# Patient Record
Sex: MALE | Race: WHITE | Employment: UNEMPLOYED | URBAN - METROPOLITAN AREA
[De-identification: names, ages, dates, MRNs, and addresses within clinical notes are randomized per-mention and may not be internally consistent; named-entity substitution may affect disease eponyms.]

---

## 2018-05-09 ENCOUNTER — OFFICE VISIT (OUTPATIENT)
Dept: ORTHOPEDIC SURGERY | Age: 58
End: 2018-05-09

## 2018-05-09 VITALS
BODY MASS INDEX: 30.71 KG/M2 | HEART RATE: 71 BPM | SYSTOLIC BLOOD PRESSURE: 112 MMHG | HEIGHT: 64 IN | DIASTOLIC BLOOD PRESSURE: 69 MMHG | WEIGHT: 179.9 LBS

## 2018-05-09 DIAGNOSIS — M48.062 SPINAL STENOSIS OF LUMBAR REGION WITH NEUROGENIC CLAUDICATION: Primary | ICD-10-CM

## 2018-05-09 PROCEDURE — 99204 OFFICE O/P NEW MOD 45 MIN: CPT | Performed by: PHYSICAL MEDICINE & REHABILITATION

## 2018-05-09 RX ORDER — PREDNISONE 10 MG/1
TABLET ORAL
Qty: 26 TABLET | Refills: 0 | Status: SHIPPED | OUTPATIENT
Start: 2018-05-09 | End: 2018-09-05

## 2018-05-09 RX ORDER — MELOXICAM 15 MG/1
15 TABLET ORAL DAILY
Qty: 30 TABLET | Refills: 1 | Status: SHIPPED | OUTPATIENT
Start: 2018-05-09 | End: 2018-09-05

## 2018-08-28 ENCOUNTER — HOSPITAL ENCOUNTER (EMERGENCY)
Age: 58
Discharge: HOME | End: 2018-08-29
Attending: EMERGENCY MEDICINE
Payer: COMMERCIAL

## 2018-08-28 ENCOUNTER — APPOINTMENT (OUTPATIENT)
Dept: CT IMAGING | Age: 58
End: 2018-08-28
Payer: COMMERCIAL

## 2018-08-28 DIAGNOSIS — N13.30 HYDROURETERONEPHROSIS: ICD-10-CM

## 2018-08-28 DIAGNOSIS — N20.1 URETEROLITHIASIS: ICD-10-CM

## 2018-08-28 DIAGNOSIS — R10.9 FLANK PAIN: Primary | ICD-10-CM

## 2018-08-28 DIAGNOSIS — K57.90 DIVERTICULOSIS OF INTESTINE WITHOUT BLEEDING, UNSPECIFIED INTESTINAL TRACT LOCATION: ICD-10-CM

## 2018-08-28 DIAGNOSIS — K80.20 CALCULUS OF GALLBLADDER WITHOUT CHOLECYSTITIS WITHOUT OBSTRUCTION: ICD-10-CM

## 2018-08-28 DIAGNOSIS — N20.0 NEPHROLITHIASIS: ICD-10-CM

## 2018-08-28 LAB
A/G RATIO: 1.4 (ref 1.1–2.2)
ALBUMIN SERPL-MCNC: 4.3 G/DL (ref 3.4–5)
ALP BLD-CCNC: 67 U/L (ref 40–129)
ALT SERPL-CCNC: 10 U/L (ref 10–40)
ANION GAP SERPL CALCULATED.3IONS-SCNC: 15 MMOL/L (ref 3–16)
AST SERPL-CCNC: 17 U/L (ref 15–37)
BASOPHILS ABSOLUTE: 0.1 K/UL (ref 0–0.2)
BASOPHILS RELATIVE PERCENT: 0.8 %
BILIRUB SERPL-MCNC: 0.4 MG/DL (ref 0–1)
BUN BLDV-MCNC: 21 MG/DL (ref 7–20)
CALCIUM SERPL-MCNC: 9.4 MG/DL (ref 8.3–10.6)
CHLORIDE BLD-SCNC: 104 MMOL/L (ref 99–110)
CO2: 20 MMOL/L (ref 21–32)
CREAT SERPL-MCNC: 0.9 MG/DL (ref 0.9–1.3)
EOSINOPHILS ABSOLUTE: 0 K/UL (ref 0–0.6)
EOSINOPHILS RELATIVE PERCENT: 0.3 %
GFR AFRICAN AMERICAN: >60
GFR NON-AFRICAN AMERICAN: >60
GLOBULIN: 3 G/DL
GLUCOSE BLD-MCNC: 108 MG/DL (ref 70–99)
HCT VFR BLD CALC: 39.5 % (ref 40.5–52.5)
HEMOGLOBIN: 13.2 G/DL (ref 13.5–17.5)
LYMPHOCYTES ABSOLUTE: 1 K/UL (ref 1–5.1)
LYMPHOCYTES RELATIVE PERCENT: 7.3 %
MCH RBC QN AUTO: 31.4 PG (ref 26–34)
MCHC RBC AUTO-ENTMCNC: 33.4 G/DL (ref 31–36)
MCV RBC AUTO: 94.1 FL (ref 80–100)
MONOCYTES ABSOLUTE: 0.8 K/UL (ref 0–1.3)
MONOCYTES RELATIVE PERCENT: 6.2 %
NEUTROPHILS ABSOLUTE: 11.3 K/UL (ref 1.7–7.7)
NEUTROPHILS RELATIVE PERCENT: 85.4 %
PDW BLD-RTO: 13.7 % (ref 12.4–15.4)
PLATELET # BLD: 213 K/UL (ref 135–450)
PMV BLD AUTO: 8.2 FL (ref 5–10.5)
POTASSIUM SERPL-SCNC: 4.1 MMOL/L (ref 3.5–5.1)
RBC # BLD: 4.2 M/UL (ref 4.2–5.9)
SODIUM BLD-SCNC: 139 MMOL/L (ref 136–145)
TOTAL PROTEIN: 7.3 G/DL (ref 6.4–8.2)
WBC # BLD: 13.2 K/UL (ref 4–11)

## 2018-08-28 PROCEDURE — 74176 CT ABD & PELVIS W/O CONTRAST: CPT

## 2018-08-28 PROCEDURE — 6360000002 HC RX W HCPCS: Performed by: EMERGENCY MEDICINE

## 2018-08-28 PROCEDURE — 96361 HYDRATE IV INFUSION ADD-ON: CPT

## 2018-08-28 PROCEDURE — 2580000003 HC RX 258: Performed by: EMERGENCY MEDICINE

## 2018-08-28 PROCEDURE — 96374 THER/PROPH/DIAG INJ IV PUSH: CPT

## 2018-08-28 PROCEDURE — 80053 COMPREHEN METABOLIC PANEL: CPT

## 2018-08-28 PROCEDURE — 96375 TX/PRO/DX INJ NEW DRUG ADDON: CPT

## 2018-08-28 PROCEDURE — 99284 EMERGENCY DEPT VISIT MOD MDM: CPT

## 2018-08-28 PROCEDURE — 85025 COMPLETE CBC W/AUTO DIFF WBC: CPT

## 2018-08-28 RX ORDER — TAMSULOSIN HYDROCHLORIDE 0.4 MG/1
0.4 CAPSULE ORAL DAILY
Status: DISCONTINUED | OUTPATIENT
Start: 2018-08-29 | End: 2018-08-29

## 2018-08-28 RX ORDER — 0.9 % SODIUM CHLORIDE 0.9 %
1000 INTRAVENOUS SOLUTION INTRAVENOUS ONCE
Status: COMPLETED | OUTPATIENT
Start: 2018-08-28 | End: 2018-08-28

## 2018-08-28 RX ORDER — KETOROLAC TROMETHAMINE 30 MG/ML
30 INJECTION, SOLUTION INTRAMUSCULAR; INTRAVENOUS ONCE
Status: COMPLETED | OUTPATIENT
Start: 2018-08-28 | End: 2018-08-28

## 2018-08-28 RX ORDER — ONDANSETRON 2 MG/ML
4 INJECTION INTRAMUSCULAR; INTRAVENOUS EVERY 30 MIN PRN
Status: DISCONTINUED | OUTPATIENT
Start: 2018-08-28 | End: 2018-08-29 | Stop reason: HOSPADM

## 2018-08-28 RX ADMIN — KETOROLAC TROMETHAMINE 30 MG: 30 INJECTION, SOLUTION INTRAMUSCULAR at 22:32

## 2018-08-28 RX ADMIN — ONDANSETRON HYDROCHLORIDE 4 MG: 2 INJECTION, SOLUTION INTRAMUSCULAR; INTRAVENOUS at 22:32

## 2018-08-28 RX ADMIN — Medication 1 MG: at 22:32

## 2018-08-28 RX ADMIN — SODIUM CHLORIDE 1000 ML: 9 INJECTION, SOLUTION INTRAVENOUS at 22:32

## 2018-08-28 ASSESSMENT — PAIN DESCRIPTION - PAIN TYPE
TYPE: ACUTE PAIN
TYPE: ACUTE PAIN

## 2018-08-28 ASSESSMENT — PAIN SCALES - GENERAL
PAINLEVEL_OUTOF10: 7
PAINLEVEL_OUTOF10: 5
PAINLEVEL_OUTOF10: 10
PAINLEVEL_OUTOF10: 8

## 2018-08-28 ASSESSMENT — PAIN DESCRIPTION - ORIENTATION
ORIENTATION: RIGHT

## 2018-08-28 ASSESSMENT — PAIN DESCRIPTION - LOCATION
LOCATION: FLANK
LOCATION: ABDOMEN

## 2018-08-28 ASSESSMENT — PAIN DESCRIPTION - DESCRIPTORS: DESCRIPTORS: ACHING

## 2018-08-29 VITALS
HEART RATE: 65 BPM | BODY MASS INDEX: 25.11 KG/M2 | OXYGEN SATURATION: 96 % | HEIGHT: 67 IN | SYSTOLIC BLOOD PRESSURE: 125 MMHG | TEMPERATURE: 98 F | RESPIRATION RATE: 14 BRPM | DIASTOLIC BLOOD PRESSURE: 90 MMHG | WEIGHT: 160 LBS

## 2018-08-29 LAB
BILIRUBIN URINE: NEGATIVE
BLOOD, URINE: ABNORMAL
CLARITY: CLEAR
COLOR: YELLOW
EPITHELIAL CELLS, UA: ABNORMAL /HPF
GLUCOSE URINE: NEGATIVE MG/DL
KETONES, URINE: 40 MG/DL
LEUKOCYTE ESTERASE, URINE: NEGATIVE
MICROSCOPIC EXAMINATION: YES
MUCUS: ABNORMAL /LPF
NITRITE, URINE: NEGATIVE
PH UA: 6
PROTEIN UA: ABNORMAL MG/DL
RBC UA: ABNORMAL /HPF (ref 0–2)
SPECIFIC GRAVITY UA: 1.02
URINE TYPE: ABNORMAL
UROBILINOGEN, URINE: 0.2 E.U./DL
WBC UA: ABNORMAL /HPF (ref 0–5)

## 2018-08-29 PROCEDURE — 81001 URINALYSIS AUTO W/SCOPE: CPT

## 2018-08-29 PROCEDURE — 6370000000 HC RX 637 (ALT 250 FOR IP): Performed by: EMERGENCY MEDICINE

## 2018-08-29 PROCEDURE — 6370000000 HC RX 637 (ALT 250 FOR IP)

## 2018-08-29 RX ORDER — TAMSULOSIN HYDROCHLORIDE 0.4 MG/1
0.4 CAPSULE ORAL DAILY
Qty: 5 CAPSULE | Refills: 0 | Status: SHIPPED | OUTPATIENT
Start: 2018-08-29 | End: 2020-04-09 | Stop reason: ALTCHOICE

## 2018-08-29 RX ORDER — TAMSULOSIN HYDROCHLORIDE 0.4 MG/1
0.4 CAPSULE ORAL ONCE
Status: COMPLETED | OUTPATIENT
Start: 2018-08-29 | End: 2018-08-29

## 2018-08-29 RX ORDER — TAMSULOSIN HYDROCHLORIDE 0.4 MG/1
0.4 CAPSULE ORAL DAILY
Qty: 10 CAPSULE | Refills: 0 | Status: SHIPPED | OUTPATIENT
Start: 2018-08-29 | End: 2018-08-29

## 2018-08-29 RX ORDER — ONDANSETRON 4 MG/1
4 TABLET, FILM COATED ORAL EVERY 8 HOURS PRN
Qty: 10 TABLET | Refills: 0 | Status: SHIPPED | OUTPATIENT
Start: 2018-08-29 | End: 2018-09-05

## 2018-08-29 RX ORDER — OXYCODONE HYDROCHLORIDE AND ACETAMINOPHEN 5; 325 MG/1; MG/1
1 TABLET ORAL EVERY 6 HOURS PRN
Qty: 15 TABLET | Refills: 0 | Status: SHIPPED | OUTPATIENT
Start: 2018-08-29 | End: 2018-09-05

## 2018-08-29 RX ORDER — TAMSULOSIN HYDROCHLORIDE 0.4 MG/1
CAPSULE ORAL
Status: COMPLETED
Start: 2018-08-29 | End: 2018-08-29

## 2018-08-29 RX ORDER — OXYCODONE HYDROCHLORIDE AND ACETAMINOPHEN 5; 325 MG/1; MG/1
1 TABLET ORAL ONCE
Status: COMPLETED | OUTPATIENT
Start: 2018-08-29 | End: 2018-08-29

## 2018-08-29 RX ADMIN — OXYCODONE HYDROCHLORIDE AND ACETAMINOPHEN 1 TABLET: 5; 325 TABLET ORAL at 00:18

## 2018-08-29 RX ADMIN — TAMSULOSIN HYDROCHLORIDE 0.4 MG: 0.4 CAPSULE ORAL at 00:17

## 2018-08-29 ASSESSMENT — PAIN SCALES - GENERAL: PAINLEVEL_OUTOF10: 4

## 2018-08-29 NOTE — ED NOTES
Bed: 15  Expected date:   Expected time:   Means of arrival:   Comments:  EMS     Cruz Greenfield RN  08/28/18 3216

## 2018-08-29 NOTE — ED NOTES
Pt continues to call out for pain medication. Dr. Trey Gamez notified. She sts she will see pt shortly. This nurse just went to tell pt that doctor should be coming in any moment. Pt raising his voice and very angry.       Alem Desir RN  08/28/18 6814

## 2018-09-05 ENCOUNTER — APPOINTMENT (OUTPATIENT)
Dept: GENERAL RADIOLOGY | Age: 58
End: 2018-09-05
Payer: COMMERCIAL

## 2018-09-05 ENCOUNTER — HOSPITAL ENCOUNTER (EMERGENCY)
Age: 58
Discharge: HOME | End: 2018-09-05
Attending: EMERGENCY MEDICINE
Payer: COMMERCIAL

## 2018-09-05 VITALS
HEART RATE: 77 BPM | DIASTOLIC BLOOD PRESSURE: 88 MMHG | BODY MASS INDEX: 29.02 KG/M2 | OXYGEN SATURATION: 99 % | TEMPERATURE: 98.4 F | HEIGHT: 64 IN | RESPIRATION RATE: 16 BRPM | SYSTOLIC BLOOD PRESSURE: 158 MMHG | WEIGHT: 170 LBS

## 2018-09-05 DIAGNOSIS — M79.604 RIGHT LEG PAIN: Primary | ICD-10-CM

## 2018-09-05 DIAGNOSIS — R31.9 HEMATURIA, UNSPECIFIED TYPE: ICD-10-CM

## 2018-09-05 DIAGNOSIS — M17.11 OSTEOARTHRITIS OF RIGHT KNEE, UNSPECIFIED OSTEOARTHRITIS TYPE: ICD-10-CM

## 2018-09-05 LAB
A/G RATIO: 1.4 (ref 1.1–2.2)
ALBUMIN SERPL-MCNC: 4.1 G/DL (ref 3.4–5)
ALP BLD-CCNC: 68 U/L (ref 40–129)
ALT SERPL-CCNC: 12 U/L (ref 10–40)
AMORPHOUS: ABNORMAL /HPF
ANION GAP SERPL CALCULATED.3IONS-SCNC: 9 MMOL/L (ref 3–16)
AST SERPL-CCNC: 17 U/L (ref 15–37)
BASOPHILS ABSOLUTE: 0.1 K/UL (ref 0–0.2)
BASOPHILS RELATIVE PERCENT: 1.1 %
BILIRUB SERPL-MCNC: 0.4 MG/DL (ref 0–1)
BILIRUBIN URINE: NEGATIVE
BLOOD, URINE: ABNORMAL
BUN BLDV-MCNC: 17 MG/DL (ref 7–20)
CALCIUM SERPL-MCNC: 9.1 MG/DL (ref 8.3–10.6)
CHLORIDE BLD-SCNC: 106 MMOL/L (ref 99–110)
CLARITY: CLEAR
CO2: 25 MMOL/L (ref 21–32)
COLOR: YELLOW
CREAT SERPL-MCNC: 0.7 MG/DL (ref 0.9–1.3)
EOSINOPHILS ABSOLUTE: 0.2 K/UL (ref 0–0.6)
EOSINOPHILS RELATIVE PERCENT: 3 %
EPITHELIAL CELLS, UA: ABNORMAL /HPF
GFR AFRICAN AMERICAN: >60
GFR NON-AFRICAN AMERICAN: >60
GLOBULIN: 2.9 G/DL
GLUCOSE BLD-MCNC: 93 MG/DL (ref 70–99)
GLUCOSE URINE: NEGATIVE MG/DL
HCT VFR BLD CALC: 40.8 % (ref 40.5–52.5)
HEMOGLOBIN: 13.8 G/DL (ref 13.5–17.5)
INR BLD: 1.02 (ref 0.86–1.14)
KETONES, URINE: NEGATIVE MG/DL
LACTIC ACID: 0.6 MMOL/L (ref 0.4–2)
LEUKOCYTE ESTERASE, URINE: NEGATIVE
LYMPHOCYTES ABSOLUTE: 1.2 K/UL (ref 1–5.1)
LYMPHOCYTES RELATIVE PERCENT: 18.1 %
MCH RBC QN AUTO: 31.8 PG (ref 26–34)
MCHC RBC AUTO-ENTMCNC: 33.8 G/DL (ref 31–36)
MCV RBC AUTO: 94.1 FL (ref 80–100)
MICROSCOPIC EXAMINATION: YES
MONOCYTES ABSOLUTE: 0.6 K/UL (ref 0–1.3)
MONOCYTES RELATIVE PERCENT: 9 %
MUCUS: ABNORMAL /LPF
NEUTROPHILS ABSOLUTE: 4.7 K/UL (ref 1.7–7.7)
NEUTROPHILS RELATIVE PERCENT: 68.8 %
NITRITE, URINE: NEGATIVE
PDW BLD-RTO: 13.8 % (ref 12.4–15.4)
PH UA: 5.5
PLATELET # BLD: 241 K/UL (ref 135–450)
PMV BLD AUTO: 7.9 FL (ref 5–10.5)
POTASSIUM SERPL-SCNC: 4.2 MMOL/L (ref 3.5–5.1)
PROTEIN UA: NEGATIVE MG/DL
PROTHROMBIN TIME: 11.6 SEC (ref 9.8–13)
RBC # BLD: 4.33 M/UL (ref 4.2–5.9)
RBC UA: ABNORMAL /HPF (ref 0–2)
SODIUM BLD-SCNC: 140 MMOL/L (ref 136–145)
SPECIFIC GRAVITY UA: >=1.03
TOTAL PROTEIN: 7 G/DL (ref 6.4–8.2)
URINE REFLEX TO CULTURE: ABNORMAL
URINE TYPE: ABNORMAL
UROBILINOGEN, URINE: 0.2 E.U./DL
WBC # BLD: 6.8 K/UL (ref 4–11)
WBC UA: ABNORMAL /HPF (ref 0–5)

## 2018-09-05 PROCEDURE — 85025 COMPLETE CBC W/AUTO DIFF WBC: CPT

## 2018-09-05 PROCEDURE — 83605 ASSAY OF LACTIC ACID: CPT

## 2018-09-05 PROCEDURE — 80053 COMPREHEN METABOLIC PANEL: CPT

## 2018-09-05 PROCEDURE — 73562 X-RAY EXAM OF KNEE 3: CPT

## 2018-09-05 PROCEDURE — 85610 PROTHROMBIN TIME: CPT

## 2018-09-05 PROCEDURE — 96360 HYDRATION IV INFUSION INIT: CPT

## 2018-09-05 PROCEDURE — 2580000003 HC RX 258: Performed by: NURSE PRACTITIONER

## 2018-09-05 PROCEDURE — 81001 URINALYSIS AUTO W/SCOPE: CPT

## 2018-09-05 PROCEDURE — 73590 X-RAY EXAM OF LOWER LEG: CPT

## 2018-09-05 PROCEDURE — 99284 EMERGENCY DEPT VISIT MOD MDM: CPT

## 2018-09-05 PROCEDURE — 93971 EXTREMITY STUDY: CPT

## 2018-09-05 PROCEDURE — 74018 RADEX ABDOMEN 1 VIEW: CPT

## 2018-09-05 RX ORDER — NAPROXEN 375 MG/1
375 TABLET ORAL 2 TIMES DAILY
Qty: 20 TABLET | Refills: 0 | Status: SHIPPED | OUTPATIENT
Start: 2018-09-05 | End: 2018-09-15

## 2018-09-05 RX ORDER — 0.9 % SODIUM CHLORIDE 0.9 %
1000 INTRAVENOUS SOLUTION INTRAVENOUS ONCE
Status: COMPLETED | OUTPATIENT
Start: 2018-09-05 | End: 2018-09-05

## 2018-09-05 RX ADMIN — SODIUM CHLORIDE 1000 ML: 9 INJECTION, SOLUTION INTRAVENOUS at 11:54

## 2018-09-05 ASSESSMENT — ENCOUNTER SYMPTOMS
SHORTNESS OF BREATH: 0
ABDOMINAL PAIN: 0

## 2018-09-05 NOTE — ED PROVIDER NOTES
[address] ED  eMERGENCY dEPARTMENT eNCOUnter        Pt Name: Alejandrina Garcia  MRN: [id number]  [place] 1960  Date of evaluation: 9/5/2018  Provider: JESSENIA Bob CNP  PCP: No primary care provider on file. ED Attending: No att. providers found    [facility]       Chief Complaint   Patient presents with    Leg Pain     Pt states that he has had intermittent leg cramping in both legs. States that he gets pain and pins and needle sensations. + calf pain. HISTORY OF PRESENT ILLNESS   (Location/Symptom, Timing/Onset, Context/Setting, Quality, Duration, Modifying Factors, Severity)  Note limiting factors. Alejandrina Garcia is a 62 y.o. male for  Right calf pain. Onset was one week ago. Duration has been since the onset. Context includes patient reporting he started with right calf pain one week ago. Alleviating factors include rest. Aggravating factors include ambulation and dorsiflexion. Pain is 2/10. Ibuprofen has been used for pain today and has not helped with pain. Nursing Notes were all reviewed and agreed with or any disagreements were addressed  in the HPI. REVIEW OF SYSTEMS    (2-9 systems for level 4, 10 or more for level 5)     Review of Systems   Constitutional: Negative for fever. Respiratory: Negative for shortness of breath. Cardiovascular: Negative for chest pain. Gastrointestinal: Negative for abdominal pain. Reports intermittent lower abdominal pain. Genitourinary: Negative for difficulty urinating. Musculoskeletal:        Reports Right calf pain for one week     Skin: Negative. Neurological: Negative. Psychiatric/Behavioral: Negative. All other systems reviewed and are negative. Positives and Pertinent negatives as per HPI. Except as noted above in the ROS, all other systems were reviewed and negative.        PAST MEDICAL HISTORY     Past Medical History:   Diagnosis Date    Cancer [facility]) 2015    rectal cancer         SURGICAL HISTORY     History reviewed. No pertinent surgical history. CURRENT MEDICATIONS       Discharge Medication List as of 9/5/2018  2:57 PM      CONTINUE these medications which have NOT CHANGED    Details   tamsulosin (FLOMAX) 0.4 MG capsule Take 1 capsule by mouth daily for 5 doses, Disp-5 capsule, R-0Print               ALLERGIES     Vicodin [hydrocodone-acetaminophen]    FAMILY HISTORY     History reviewed. No pertinent family history. SOCIAL HISTORY       Social History     Social History    Marital status: Single     Spouse name: N/A    Number of children: N/A    Years of education: N/A     Social History Main Topics    Smoking status: Current Every Day Smoker     Packs/day: 1.00     Types: Cigarettes    Smokeless tobacco: Never Used    Alcohol use No    Drug use: No    Sexual activity: Not Asked     Other Topics Concern    None     Social History Narrative    None       SCREENINGS             PHYSICAL EXAM    (up to 7 for level 4, 8 or more for level 5)     ED Triage Vitals   BP Temp Temp src Pulse Resp SpO2 Height Weight   09/05/18 1059 09/05/18 1056 -- 09/05/18 1059 09/05/18 1059 09/05/18 1059 09/05/18 1056 09/05/18 1056   (!) 143/98 98.4 °F (36.9 °C)  83 15 99 % 5' 4\" (1.626 m) 170 lb (77.1 kg)       Physical Exam   Constitutional: He is oriented to person, place, and time. He appears well-developed and well-nourished. HENT:   Head: Normocephalic and atraumatic. Neck: Normal range of motion. Cardiovascular: Normal rate and normal heart sounds. Pulmonary/Chest: Effort normal and breath sounds normal. No respiratory distress. Abdominal: Soft. Bowel sounds are normal. He exhibits no distension. Patient states he was diagnosed with a kidney stone one week ago and doesn't think he has passed the stone. Patient reports that he is having continued lower abdominal pain that has been intermittent and \"feels like spasms\".  Denies dysuria or difficulty with urination. Musculoskeletal: Normal range of motion. He exhibits tenderness. Right lower leg: He exhibits tenderness. Legs:  Neurological: He is alert and oriented to person, place, and time. Skin: Skin is warm and dry. Psychiatric: He has a normal mood and affect. DIAGNOSTIC RESULTS   LABS:    Labs Reviewed   COMPREHENSIVE METABOLIC PANEL - Abnormal; Notable for the following:        Result Value    CREATININE 0.7 (*)     All other components within normal limits    Narrative:     Performed at:  [facility] [address],  [place] [facility]   Phone [phone]   URINE RT REFLEX TO CULTURE - Abnormal; Notable for the following:     Blood, Urine SMALL (*)     All other components within normal limits    Narrative:     Performed at:  [place] [address],  [place] [facility]   Phone [phone]   MICROSCOPIC URINALYSIS - Abnormal; Notable for the following:     Mucus, UA 2+ (*)     RBC, UA 5-10 (*)     Amorphous, UA 1+ (*)     All other components within normal limits    Narrative:     Performed at:  [place] [address],  [place], [facility]   Phone [phone]   CBC WITH AUTO DIFFERENTIAL    Narrative:     Performed at:  [place] [address],  [place] [facility]   Phone [phone]   LACTIC ACID, PLASMA    Narrative:     Performed at:  [facility] [address],  [place], [facility]   Phone [phone]   PROTIME-INR    Narrative:     Performed at:  [facility]) - [facility],  [place]   Phone [phone]       All other labs were within normal range or not returned as of this dictation. EKG:  All EKG's are interpreted by the Emergency Department Physician who either signs or Co-signs this chart in the absence of a cardiologist.  Please see their note for interpretation of EKG. RADIOLOGY:     Right knee and right tibia-fibula x-ray x-ray interpreted by radiologist for mild degenerative changes of the right knee. No acute abnormality involving the knee tibia or fibula. Abdominal x-ray interpreted by radiologist for no acute findings. Venous ultrasound interpreted by radiology tech for Summary      1. There is no evidence of deep or superficial venous thrombosis in the   right lower extremity or left common femoral vein.   2. Incidental finding of a hypoechoic nonvascular area at the right medial   proximal calf measuring 1.1 X 0.5 cm. Interpretation per the Radiologist below, if available at the time of this note:    XR KNEE RIGHT (3 VIEWS)   Final Result   Mild degenerative change in the right knee. No acute abnormality involving   the knee, tibia or fibula. XR TIBIA FIBULA RIGHT (2 VIEWS)   Final Result   Mild degenerative change in the right knee. No acute abnormality involving   the knee, tibia or fibula. XR ABDOMEN (KUB) (SINGLE AP VIEW)   Final Result   No acute findings         VL Extremity Venous Right   Final Result        No results found. PROCEDURES   Unless otherwise noted below, none     Procedures    CRITICAL CARE TIME   N/A    CONSULTS:  None      EMERGENCY DEPARTMENT COURSE and DIFFERENTIAL DIAGNOSIS/MDM:   Vitals:    Vitals:    09/05/18 1056 09/05/18 1059 09/05/18 1332   BP:  (!) 143/98 (!) 158/88   Pulse:  83 77   Resp:  15 16   Temp: 98.4 °F (36.9 °C)     SpO2:  99% 99%   Weight: 170 lb (77.1 kg)     Height: 5' 4\" (1.626 m)         Patient was given the following medications:  Medications   0.9 % sodium chloride bolus (0 mLs Intravenous Stopped 9/5/18 1254)       Patient was seen and evaluated by Dr. Tatiana Douglass and myself. Patient here for leg pain. Patient reports that he has intermittent cramping to his right calf.   Patient denies any cardiovascular or PE risk factors with the exception of cancer. Lab values were reviewed and interpreted. On exam patient's awake and alert hemodynamically stable nontoxic in appearance. Patient be discharged home with right leg pain. He was also given instructions to return to the ED for any worsening symptoms. He was given a PCP and encouraged to follow up and establish care. Patient was discharged home with all questions answered. The patient tolerated their visit well. They were seen and evaluated by the attending physician, No att. providers found who agreed with the assessment and plan. The patient and / or the family were informed of the results of any tests, a time was given to answer questions, a plan was proposed and they agreed with plan. FINAL IMPRESSION      1. Right leg pain    2. Hematuria, unspecified type    3.  Osteoarthritis of right knee, unspecified osteoarthritis type          DISPOSITION/PLAN   DISPOSITION Decision To Discharge 09/05/2018 02:50:15 PM      PATIENT REFERRED TO:  [place] [facility]  [address] [facility]  [phone]    If symptoms worsen    [address]  [phone]  Schedule an appointment as soon as possible for a visit in 1 week  If symptoms worsen    Keya Mendoza, Lizeth Maya [address] [facility] [address]  [phone]    Schedule an appointment as soon as possible for a visit in 2 days  for re-evaluation      DISCHARGE MEDICATIONS:  Discharge Medication List as of 9/5/2018  2:57 PM      START taking these medications    Details   naproxen (NAPROSYN) 375 MG tablet Take 1 tablet by mouth 2 times daily for 10 days, Disp-20 tablet, R-0Print             DISCONTINUED MEDICATIONS:  Discharge Medication List as of 9/5/2018  2:57 PM      STOP taking these medications       oxyCODONE-acetaminophen (PERCOCET) 5-325 MG per tablet Comments:   Reason for Stopping: ondansetron (ZOFRAN) 4 MG tablet Comments:   Reason for Stopping:         meloxicam (MOBIC) 15 MG tablet Comments:   Reason for Stopping:         predniSONE (DELTASONE) 10 MG tablet Comments:   Reason for Stopping:                      (Please note that portions of this note were completed with a voice recognition program.  Efforts were made to edit the dictations but occasionally words are mis-transcribed.)    JESSENIA Cardenas CNP (electronically signed)     JESSENIA Cardenas CNP  09/05/18 9705

## 2018-09-05 NOTE — ED NOTES
Pt is out of the ER at this time, Pt is at vascular at this time.      Nancy Burden RN  09/05/18 0563

## 2020-04-09 ENCOUNTER — HOSPITAL ENCOUNTER (EMERGENCY)
Age: 60
Discharge: HOME | End: 2020-04-09
Payer: MEDICARE

## 2020-04-09 VITALS
BODY MASS INDEX: 29.18 KG/M2 | TEMPERATURE: 97.8 F | SYSTOLIC BLOOD PRESSURE: 128 MMHG | DIASTOLIC BLOOD PRESSURE: 75 MMHG | WEIGHT: 170 LBS | RESPIRATION RATE: 19 BRPM | OXYGEN SATURATION: 97 % | HEART RATE: 72 BPM

## 2020-04-09 LAB
A/G RATIO: 1.3 (ref 1.1–2.2)
ALBUMIN SERPL-MCNC: 4.5 G/DL (ref 3.4–5)
ALP BLD-CCNC: 81 U/L (ref 40–129)
ALT SERPL-CCNC: 12 U/L (ref 10–40)
ANION GAP SERPL CALCULATED.3IONS-SCNC: 13 MMOL/L (ref 3–16)
AST SERPL-CCNC: 16 U/L (ref 15–37)
BASOPHILS ABSOLUTE: 0.1 K/UL (ref 0–0.2)
BASOPHILS RELATIVE PERCENT: 0.7 %
BILIRUB SERPL-MCNC: 0.3 MG/DL (ref 0–1)
BILIRUBIN URINE: NEGATIVE
BLOOD, URINE: ABNORMAL
BUN BLDV-MCNC: 19 MG/DL (ref 7–20)
CALCIUM SERPL-MCNC: 9.7 MG/DL (ref 8.3–10.6)
CHLORIDE BLD-SCNC: 101 MMOL/L (ref 99–110)
CLARITY: CLEAR
CO2: 23 MMOL/L (ref 21–32)
COLOR: YELLOW
CREAT SERPL-MCNC: 0.7 MG/DL (ref 0.8–1.3)
EOSINOPHILS ABSOLUTE: 0.2 K/UL (ref 0–0.6)
EOSINOPHILS RELATIVE PERCENT: 2 %
EPITHELIAL CELLS, UA: ABNORMAL /HPF (ref 0–5)
GFR AFRICAN AMERICAN: >60
GFR NON-AFRICAN AMERICAN: >60
GLOBULIN: 3.4 G/DL
GLUCOSE BLD-MCNC: 106 MG/DL (ref 70–99)
GLUCOSE URINE: NEGATIVE MG/DL
HCT VFR BLD CALC: 43.4 % (ref 40.5–52.5)
HEMOGLOBIN: 14.8 G/DL (ref 13.5–17.5)
KETONES, URINE: NEGATIVE MG/DL
LEUKOCYTE ESTERASE, URINE: NEGATIVE
LYMPHOCYTES ABSOLUTE: 2 K/UL (ref 1–5.1)
LYMPHOCYTES RELATIVE PERCENT: 19.7 %
MCH RBC QN AUTO: 31.7 PG (ref 26–34)
MCHC RBC AUTO-ENTMCNC: 34 G/DL (ref 31–36)
MCV RBC AUTO: 93.3 FL (ref 80–100)
MICROSCOPIC EXAMINATION: YES
MONOCYTES ABSOLUTE: 0.6 K/UL (ref 0–1.3)
MONOCYTES RELATIVE PERCENT: 6.4 %
MUCUS: ABNORMAL /LPF
NEUTROPHILS ABSOLUTE: 7.1 K/UL (ref 1.7–7.7)
NEUTROPHILS RELATIVE PERCENT: 71.2 %
NITRITE, URINE: NEGATIVE
PDW BLD-RTO: 13.8 % (ref 12.4–15.4)
PH UA: 5.5 (ref 5–8)
PLATELET # BLD: 275 K/UL (ref 135–450)
PMV BLD AUTO: 7.8 FL (ref 5–10.5)
POTASSIUM REFLEX MAGNESIUM: 4.3 MMOL/L (ref 3.5–5.1)
PROTEIN UA: NEGATIVE MG/DL
RBC # BLD: 4.65 M/UL (ref 4.2–5.9)
RBC UA: ABNORMAL /HPF (ref 0–4)
SODIUM BLD-SCNC: 137 MMOL/L (ref 136–145)
SPECIFIC GRAVITY UA: 1.02 (ref 1–1.03)
TOTAL PROTEIN: 7.9 G/DL (ref 6.4–8.2)
URINE REFLEX TO CULTURE: ABNORMAL
URINE TYPE: ABNORMAL
UROBILINOGEN, URINE: 0.2 E.U./DL
WBC # BLD: 10 K/UL (ref 4–11)
WBC UA: ABNORMAL /HPF (ref 0–5)

## 2020-04-09 PROCEDURE — 85025 COMPLETE CBC W/AUTO DIFF WBC: CPT

## 2020-04-09 PROCEDURE — 81001 URINALYSIS AUTO W/SCOPE: CPT

## 2020-04-09 PROCEDURE — 80053 COMPREHEN METABOLIC PANEL: CPT

## 2020-04-09 PROCEDURE — 6360000002 HC RX W HCPCS: Performed by: PHYSICIAN ASSISTANT

## 2020-04-09 PROCEDURE — 2580000003 HC RX 258: Performed by: PHYSICIAN ASSISTANT

## 2020-04-09 PROCEDURE — 99284 EMERGENCY DEPT VISIT MOD MDM: CPT

## 2020-04-09 PROCEDURE — 96374 THER/PROPH/DIAG INJ IV PUSH: CPT

## 2020-04-09 RX ORDER — HYDROCODONE BITARTRATE AND ACETAMINOPHEN 5; 325 MG/1; MG/1
1 TABLET ORAL EVERY 6 HOURS PRN
Qty: 10 TABLET | Refills: 0 | Status: SHIPPED | OUTPATIENT
Start: 2020-04-09 | End: 2020-04-12

## 2020-04-09 RX ORDER — KETOROLAC TROMETHAMINE 10 MG/1
10 TABLET, FILM COATED ORAL EVERY 6 HOURS PRN
Qty: 20 TABLET | Refills: 0 | Status: SHIPPED | OUTPATIENT
Start: 2020-04-09 | End: 2020-06-01

## 2020-04-09 RX ORDER — 0.9 % SODIUM CHLORIDE 0.9 %
1000 INTRAVENOUS SOLUTION INTRAVENOUS ONCE
Status: COMPLETED | OUTPATIENT
Start: 2020-04-09 | End: 2020-04-09

## 2020-04-09 RX ORDER — KETOROLAC TROMETHAMINE 30 MG/ML
15 INJECTION, SOLUTION INTRAMUSCULAR; INTRAVENOUS ONCE
Status: COMPLETED | OUTPATIENT
Start: 2020-04-09 | End: 2020-04-09

## 2020-04-09 RX ADMIN — KETOROLAC TROMETHAMINE 15 MG: 30 INJECTION, SOLUTION INTRAMUSCULAR at 13:52

## 2020-04-09 RX ADMIN — SODIUM CHLORIDE 1000 ML: 9 INJECTION, SOLUTION INTRAVENOUS at 13:52

## 2020-04-09 ASSESSMENT — PAIN DESCRIPTION - ORIENTATION: ORIENTATION: RIGHT

## 2020-04-09 ASSESSMENT — ENCOUNTER SYMPTOMS
SHORTNESS OF BREATH: 0
VOMITING: 0
BACK PAIN: 1
ABDOMINAL PAIN: 0
NAUSEA: 0
CHEST TIGHTNESS: 0
EYES NEGATIVE: 1

## 2020-04-09 ASSESSMENT — PAIN DESCRIPTION - PAIN TYPE: TYPE: ACUTE PAIN

## 2020-04-09 ASSESSMENT — PAIN DESCRIPTION - LOCATION: LOCATION: FLANK

## 2020-04-09 ASSESSMENT — PAIN SCALES - GENERAL: PAINLEVEL_OUTOF10: 6

## 2020-04-09 NOTE — ED PROVIDER NOTES
and trouble getting urine out. R flank/lower back pain   Musculoskeletal: Positive for back pain. Negative for gait problem and neck pain. Skin: Negative for rash. Neurological: Negative for dizziness, light-headedness and headaches. All other systems reviewed and are negative. Except as noted above in the ROS, all other systems were reviewed and negative. PAST MEDICAL HISTORY:     Past Medical History:   Diagnosis Date    Cancer [facility]) 2015    rectal cancer         SURGICAL HISTORY:    History reviewed. No pertinent surgical history. CURRENT MEDICATIONS:       Previous Medications    No medications on file         ALLERGIES:    Vicodin [hydrocodone-acetaminophen]    FAMILY HISTORY:     History reviewed. No pertinent family history.        SOCIAL HISTORY:       Social History     Socioeconomic History    Marital status: Single     Spouse name: None    Number of children: None    Years of education: None    Highest education level: None   Occupational History    None   Social Needs    Financial resource strain: None    Food insecurity     Worry: None     Inability: None    Transportation needs     Medical: None     Non-medical: None   Tobacco Use    Smoking status: Current Every Day Smoker     Packs/day: 1.00     Types: Cigarettes    Smokeless tobacco: Never Used   Substance and Sexual Activity    Alcohol use: No    Drug use: No    Sexual activity: None   Lifestyle    Physical activity     Days per week: None     Minutes per session: None    Stress: None   Relationships    Social connections     Talks on phone: None     Gets together: None     Attends Hinduism service: None     Active member of club or organization: None     Attends meetings of clubs or organizations: None     Relationship status: None    Intimate partner violence     Fear of current or ex partner: None     Emotionally abused: None     Physically abused: None     Forced sexual activity: None   Other Topics Concern    None   Social History Narrative    None       SCREENINGS:    [place] Coma Scale  Eye Opening: Spontaneous  Best Verbal Response: Oriented  Best Motor Response: Obeys commands  [place] Coma Scale Score: 15        PHYSICAL EXAM:       ED Triage Vitals [04/09/20 1328]   BP Temp Temp Source Pulse Resp SpO2 Height Weight   (!) 149/85 97.8 °F (36.6 °C) Oral 95 18 98 % -- 170 lb (77.1 kg)       Physical Exam    CONSTITUTIONAL: Awake and alert. Cooperative. Well-developed. Well-nourished. Non-toxic. No acute distress. HENT: Normocephalic. Atraumatic. External ears normal, without discharge. No nasal discharge. Oropharynx clear. Mucous membranes moist.  EYES: Conjunctiva non-injected. No scleral icterus. PERRL. EOM's grossly intact. NECK: Supple. Normal ROM. CARDIOVASCULAR: RRR. No Murmer. Intact distal pulses. PULMONARY/CHEST WALL: Effort normal. No tachypnea. Lungs clear to ausculation. ABDOMEN: Normal BS. Soft. Nondistended. No tenderness to palpate. No guarding. Mild right-sided CVA tenderness. /ANORECTAL: Not assessed  MUSKULOSKELETAL: Back: Normal ROM. No acute deformities. No edema. Mild tenderness to the right lower thoracic and lumbar musculature. No midline spinous process tenderness. No step-off. No crepitus. Oumar Armando SKIN: Warm and dry. No rash. NEUROLOGICAL: Alert and oriented x 3. GCS 15. CN II-XII grossly intact. Strength is 5/5 in all extremities and sensation is intact. Normal gait.   PSYCHIATRIC: Normal affect        DIAGNOSTICRESULTS:     LABS:    Results for orders placed or performed during the hospital encounter of 04/09/20   Urinalysis Reflex to Culture   Result Value Ref Range    Color, UA Yellow Straw/Yellow    Clarity, UA Clear Clear    Glucose, Ur Negative Negative mg/dL    Bilirubin Urine Negative Negative    Ketones, Urine Negative Negative mg/dL    Specific Gravity, UA 1.025 1.005 - 1.030    Blood, Urine SMALL (A) Negative    pH, UA 5.5 5.0 - 8.0    Protein, UA Negative CT results. Given Toradol and IVF to help manage pain and hydrate. No opiates given as patient drove. Patient instructed to increased fluids and follow up with PCP as well as urology. I estimate there is LOW risk for ACUTE APPENDICITIS, PYELONEPHRITIS, BOWEL OBSTRUCTION, CHOLECYSTITIS, DIVERTICULITIS, INCARCERATED HERNIA, PANCREATITIS, PERFORATED BOWEL or ULCER, thus I consider the discharge disposition reasonable. Also, there is no evidence or peritonitis, sepsis, or toxicity. Regina Lentz and I have discussed the diagnosis and risks, and we agree with discharging home to follow-up with their primary doctor. We also discussed returning to the Emergency Department immediately if new or worsening symptoms occur. We have discussed the symptoms which are most concerning (e.g., bloody stool, fever, changing or worsening pain, vomiting) that necessitate immediate return. FINAL IMPRESSION:      1. Right flank pain    2. Microscopic hematuria          DISPOSITION/PLAN:   DISPOSITION Decision To Discharge      PATIENT REFERRED TO:  [facility] [place] [phone]    Schedule an appointment as soon as possible for a visit         DISCHARGE MEDICATIONS:  New Prescriptions    HYDROCODONE-ACETAMINOPHEN (NORCO) 5-325 MG PER TABLET    Take 1 tablet by mouth every 6 hours as needed for Pain for up to 3 days. Intended supply: 3 days.  Take lowest dose possible to manage pain    KETOROLAC (TORADOL) 10 MG TABLET    Take 1 tablet by mouth every 6 hours as needed for Pain                  (Please note thatportions of this note were completed with a voice recognition program.  Efforts were made to edit the dictations, but occasionally words are mis-transcribed.)    Pia Steward PA-C (electronicallysigned)             [place], [place]  04/09/20 8128

## 2020-06-01 ENCOUNTER — APPOINTMENT (OUTPATIENT)
Dept: GENERAL RADIOLOGY | Age: 60
End: 2020-06-01
Payer: MEDICARE

## 2020-06-01 ENCOUNTER — APPOINTMENT (OUTPATIENT)
Dept: VASCULAR LAB | Age: 60
End: 2020-06-01
Payer: MEDICARE

## 2020-06-01 ENCOUNTER — HOSPITAL ENCOUNTER (EMERGENCY)
Age: 60
Discharge: HOME | End: 2020-06-01
Payer: MEDICARE

## 2020-06-01 VITALS
HEART RATE: 90 BPM | SYSTOLIC BLOOD PRESSURE: 155 MMHG | TEMPERATURE: 98.6 F | HEIGHT: 64 IN | WEIGHT: 168 LBS | DIASTOLIC BLOOD PRESSURE: 84 MMHG | OXYGEN SATURATION: 100 % | BODY MASS INDEX: 28.68 KG/M2 | RESPIRATION RATE: 20 BRPM

## 2020-06-01 PROCEDURE — 93971 EXTREMITY STUDY: CPT

## 2020-06-01 PROCEDURE — 6370000000 HC RX 637 (ALT 250 FOR IP): Performed by: NURSE PRACTITIONER

## 2020-06-01 PROCEDURE — 73630 X-RAY EXAM OF FOOT: CPT

## 2020-06-01 PROCEDURE — 99283 EMERGENCY DEPT VISIT LOW MDM: CPT

## 2020-06-01 RX ORDER — OXYCODONE HYDROCHLORIDE AND ACETAMINOPHEN 5; 325 MG/1; MG/1
1 TABLET ORAL ONCE
Status: COMPLETED | OUTPATIENT
Start: 2020-06-01 | End: 2020-06-01

## 2020-06-01 RX ORDER — OXYCODONE HYDROCHLORIDE AND ACETAMINOPHEN 5; 325 MG/1; MG/1
1-2 TABLET ORAL EVERY 6 HOURS PRN
Qty: 10 TABLET | Refills: 0 | Status: SHIPPED | OUTPATIENT
Start: 2020-06-01 | End: 2020-06-04

## 2020-06-01 RX ADMIN — OXYCODONE HYDROCHLORIDE AND ACETAMINOPHEN 1 TABLET: 5; 325 TABLET ORAL at 13:58

## 2020-06-01 ASSESSMENT — PAIN SCALES - GENERAL
PAINLEVEL_OUTOF10: 8
PAINLEVEL_OUTOF10: 10
PAINLEVEL_OUTOF10: 7

## 2020-06-09 ENCOUNTER — HOSPITAL ENCOUNTER (EMERGENCY)
Age: 60
Discharge: LEFT BEFORE DISCHARGE | End: 2020-06-09
Payer: MEDICARE

## 2020-06-09 VITALS
WEIGHT: 170 LBS | HEIGHT: 64 IN | SYSTOLIC BLOOD PRESSURE: 129 MMHG | DIASTOLIC BLOOD PRESSURE: 87 MMHG | BODY MASS INDEX: 29.02 KG/M2 | OXYGEN SATURATION: 98 % | TEMPERATURE: 98 F | HEART RATE: 84 BPM | RESPIRATION RATE: 16 BRPM

## 2020-06-09 PROCEDURE — 4500000002 HC ER NO CHARGE

## 2020-06-09 ASSESSMENT — PAIN SCALES - GENERAL: PAINLEVEL_OUTOF10: 10

## 2021-02-13 ENCOUNTER — HOSPITAL ENCOUNTER (EMERGENCY)
Age: 61
Discharge: HOME | End: 2021-02-13
Attending: EMERGENCY MEDICINE
Payer: MEDICARE

## 2021-02-13 ENCOUNTER — APPOINTMENT (OUTPATIENT)
Dept: CT IMAGING | Age: 61
End: 2021-02-13
Payer: MEDICARE

## 2021-02-13 VITALS
HEIGHT: 64 IN | DIASTOLIC BLOOD PRESSURE: 82 MMHG | BODY MASS INDEX: 22.2 KG/M2 | HEART RATE: 97 BPM | WEIGHT: 130 LBS | RESPIRATION RATE: 18 BRPM | OXYGEN SATURATION: 97 % | TEMPERATURE: 97.5 F | SYSTOLIC BLOOD PRESSURE: 157 MMHG

## 2021-02-13 DIAGNOSIS — E86.0 DEHYDRATION: Primary | ICD-10-CM

## 2021-02-13 LAB
A/G RATIO: 0.9 (ref 1.1–2.2)
ALBUMIN SERPL-MCNC: 3.8 G/DL (ref 3.4–5)
ALP BLD-CCNC: 122 U/L (ref 40–129)
ALT SERPL-CCNC: 12 U/L (ref 10–40)
ANION GAP SERPL CALCULATED.3IONS-SCNC: 14 MMOL/L (ref 3–16)
AST SERPL-CCNC: 25 U/L (ref 15–37)
BASOPHILS ABSOLUTE: 0.1 K/UL (ref 0–0.2)
BASOPHILS RELATIVE PERCENT: 1.3 %
BILIRUB SERPL-MCNC: 0.3 MG/DL (ref 0–1)
BUN BLDV-MCNC: 13 MG/DL (ref 7–20)
CALCIUM SERPL-MCNC: 9.7 MG/DL (ref 8.3–10.6)
CHLORIDE BLD-SCNC: 98 MMOL/L (ref 99–110)
CO2: 22 MMOL/L (ref 21–32)
CREAT SERPL-MCNC: 0.7 MG/DL (ref 0.8–1.3)
EOSINOPHILS ABSOLUTE: 0.1 K/UL (ref 0–0.6)
EOSINOPHILS RELATIVE PERCENT: 0.5 %
GFR AFRICAN AMERICAN: >60
GFR NON-AFRICAN AMERICAN: >60
GLOBULIN: 4.2 G/DL
GLUCOSE BLD-MCNC: 130 MG/DL (ref 70–99)
HCT VFR BLD CALC: 35.9 % (ref 40.5–52.5)
HEMOGLOBIN: 11.7 G/DL (ref 13.5–17.5)
LYMPHOCYTES ABSOLUTE: 1 K/UL (ref 1–5.1)
LYMPHOCYTES RELATIVE PERCENT: 8.4 %
MCH RBC QN AUTO: 27.5 PG (ref 26–34)
MCHC RBC AUTO-ENTMCNC: 32.4 G/DL (ref 31–36)
MCV RBC AUTO: 84.8 FL (ref 80–100)
MONOCYTES ABSOLUTE: 0.7 K/UL (ref 0–1.3)
MONOCYTES RELATIVE PERCENT: 6.4 %
NEUTROPHILS ABSOLUTE: 9.4 K/UL (ref 1.7–7.7)
NEUTROPHILS RELATIVE PERCENT: 83.4 %
PDW BLD-RTO: 15 % (ref 12.4–15.4)
PLATELET # BLD: 520 K/UL (ref 135–450)
PMV BLD AUTO: 7.6 FL (ref 5–10.5)
POTASSIUM REFLEX MAGNESIUM: 4.8 MMOL/L (ref 3.5–5.1)
RBC # BLD: 4.24 M/UL (ref 4.2–5.9)
SODIUM BLD-SCNC: 134 MMOL/L (ref 136–145)
TOTAL PROTEIN: 8 G/DL (ref 6.4–8.2)
WBC # BLD: 11.3 K/UL (ref 4–11)

## 2021-02-13 PROCEDURE — 2580000003 HC RX 258: Performed by: STUDENT IN AN ORGANIZED HEALTH CARE EDUCATION/TRAINING PROGRAM

## 2021-02-13 PROCEDURE — 96361 HYDRATE IV INFUSION ADD-ON: CPT

## 2021-02-13 PROCEDURE — 96360 HYDRATION IV INFUSION INIT: CPT

## 2021-02-13 PROCEDURE — 6360000002 HC RX W HCPCS: Performed by: STUDENT IN AN ORGANIZED HEALTH CARE EDUCATION/TRAINING PROGRAM

## 2021-02-13 PROCEDURE — 74177 CT ABD & PELVIS W/CONTRAST: CPT

## 2021-02-13 PROCEDURE — 6360000004 HC RX CONTRAST MEDICATION: Performed by: EMERGENCY MEDICINE

## 2021-02-13 PROCEDURE — 99283 EMERGENCY DEPT VISIT LOW MDM: CPT

## 2021-02-13 PROCEDURE — 85025 COMPLETE CBC W/AUTO DIFF WBC: CPT

## 2021-02-13 PROCEDURE — 96374 THER/PROPH/DIAG INJ IV PUSH: CPT

## 2021-02-13 PROCEDURE — 80053 COMPREHEN METABOLIC PANEL: CPT

## 2021-02-13 PROCEDURE — 96375 TX/PRO/DX INJ NEW DRUG ADDON: CPT

## 2021-02-13 RX ORDER — SODIUM CHLORIDE 9 MG/ML
1000 INJECTION, SOLUTION INTRAVENOUS CONTINUOUS
Status: DISCONTINUED | OUTPATIENT
Start: 2021-02-13 | End: 2021-02-13

## 2021-02-13 RX ORDER — ONDANSETRON 4 MG/1
4 TABLET, ORALLY DISINTEGRATING ORAL EVERY 8 HOURS PRN
Qty: 30 TABLET | Refills: 0 | Status: SHIPPED | OUTPATIENT
Start: 2021-02-13

## 2021-02-13 RX ORDER — 0.9 % SODIUM CHLORIDE 0.9 %
1000 INTRAVENOUS SOLUTION INTRAVENOUS ONCE
Status: COMPLETED | OUTPATIENT
Start: 2021-02-13 | End: 2021-02-13

## 2021-02-13 RX ORDER — ONDANSETRON 2 MG/ML
4 INJECTION INTRAMUSCULAR; INTRAVENOUS ONCE
Status: COMPLETED | OUTPATIENT
Start: 2021-02-13 | End: 2021-02-13

## 2021-02-13 RX ORDER — CLOPIDOGREL BISULFATE 75 MG/1
75 TABLET ORAL DAILY
COMMUNITY
Start: 2020-06-15

## 2021-02-13 RX ORDER — ACETAMINOPHEN 325 MG/1
650 TABLET ORAL EVERY 6 HOURS
COMMUNITY
Start: 2020-07-16

## 2021-02-13 RX ORDER — MORPHINE SULFATE 4 MG/ML
4 INJECTION, SOLUTION INTRAMUSCULAR; INTRAVENOUS ONCE
Status: COMPLETED | OUTPATIENT
Start: 2021-02-13 | End: 2021-02-13

## 2021-02-13 RX ORDER — OXYCODONE HYDROCHLORIDE 5 MG/1
TABLET ORAL
COMMUNITY
Start: 2021-02-03

## 2021-02-13 RX ADMIN — SODIUM CHLORIDE 1000 ML: 9 INJECTION, SOLUTION INTRAVENOUS at 07:57

## 2021-02-13 RX ADMIN — ONDANSETRON 4 MG: 2 INJECTION INTRAMUSCULAR; INTRAVENOUS at 07:53

## 2021-02-13 RX ADMIN — MORPHINE SULFATE 4 MG: 4 INJECTION, SOLUTION INTRAMUSCULAR; INTRAVENOUS at 07:53

## 2021-02-13 RX ADMIN — IOPAMIDOL 75 ML: 755 INJECTION, SOLUTION INTRAVENOUS at 07:45

## 2021-02-13 RX ADMIN — SODIUM CHLORIDE 1000 ML: 9 INJECTION, SOLUTION INTRAVENOUS at 06:23

## 2021-02-13 ASSESSMENT — ENCOUNTER SYMPTOMS
SHORTNESS OF BREATH: 0
VOMITING: 0
TROUBLE SWALLOWING: 1
NAUSEA: 0
DIARRHEA: 1
COUGH: 0
WHEEZING: 0
BLOOD IN STOOL: 0
ABDOMINAL PAIN: 1

## 2021-02-13 ASSESSMENT — PAIN SCALES - GENERAL: PAINLEVEL_OUTOF10: 9

## 2021-02-13 ASSESSMENT — PAIN DESCRIPTION - LOCATION: LOCATION: ABDOMEN;RECTUM

## 2021-02-13 NOTE — ED PROVIDER NOTES
Emergency Department Provider Note  Location: [address] [place] [address]  ED  2/13/2021     Patient Identification  Derek Shultz is a 61 y.o. male    Chief Complaint  Dehydration (sees dr. Dasha Lombardo oncology at [address] [facility], worried about being deydrated states his mouth is dry. )      Mode of Arrival  private car    HPI  (History provided by patient)  This is a 61 y.o. male with a PMH significant for rectal cancer presented today for dehydration. Noticed the symptoms 1 week ago. First diagnosed with rectal cancer 4-5 years ago, then recently was diagnosed that it came back. Seeing oncology and was started on new monthly medication, but doesn't know then name (last dose 20 days ago, looks like it's Nivolumab per [facility] records). Has since developed dry mouth, difficulty swallowing, and softer stools. Has pain \"in my backside and my frontside\". He then specified that it was his rectum. States it's like a cramp. All started once he started feeling dry. Stated that he had 4 16 oz. glasses of water yesterday. Had spare ribs, asparagus, and baked potato for dinner, but wasn't able to eat it all. Urinated 6 times yesterday. ROS  Review of Systems   Constitutional: Negative for chills, fatigue and fever. HENT: Positive for trouble swallowing (States mostly because he's so dry. No problems drinking. ). Respiratory: Negative for cough, shortness of breath and wheezing. Cardiovascular: Negative for chest pain. Gastrointestinal: Positive for abdominal pain and diarrhea. Negative for blood in stool, nausea and vomiting. Rectal pain   Genitourinary: Positive for decreased urine volume. Negative for dysuria and hematuria. Musculoskeletal: Negative for myalgias. Neurological: Negative for dizziness and light-headedness. I have reviewed the following nursing documentation:  Allergies:    Allergies   Allergen Reactions    Vicodin [Hydrocodone-Acetaminophen] Nausea And Vomiting       Past medical history:  has a past medical history of Cancer ([facility] [address] (2015). Past surgical history:  has no past surgical history on file. Home medications:   Prior to Admission medications    Medication Sig Start Date End Date Taking? Authorizing Provider   acetaminophen (TYLENOL) 325 MG tablet Take 650 mg by mouth every 6 hours 7/16/20  Yes Historical Provider, MD   clopidogrel (PLAVIX) 75 MG tablet Take 75 mg by mouth daily 6/15/20  Yes Historical Provider, MD   ondansetron (ZOFRAN ODT) 4 MG disintegrating tablet Take 1 tablet by mouth every 8 hours as needed for Nausea or Vomiting 2/13/21  Yes Silva Going, DO   oxyCODONE (ROXICODONE) 5 MG immediate release tablet  2/3/21   Historical Provider, MD       Social history:  reports that he has been smoking cigarettes. He has been smoking about 1.00 pack per day. He has never used smokeless tobacco. He reports that he does not drink alcohol or use drugs. Family history:  No family history on file. Exam  ED Triage Vitals   BP Temp Temp Source Pulse Resp SpO2 Height Weight   02/13/21 0559 02/13/21 0555 02/13/21 0555 02/13/21 0555 02/13/21 0555 02/13/21 0555 02/13/21 0555 02/13/21 0555   (!) 178/79 97.5 °F (36.4 °C) Oral 97 18 98 % 5' 4\" (1.626 m) 130 lb (59 kg)     Physical Exam  Constitutional:       General: He is not in acute distress. Appearance: Normal appearance. He is normal weight. He is not ill-appearing. HENT:      Head: Normocephalic and atraumatic. Right Ear: External ear normal.      Left Ear: External ear normal.      Mouth/Throat:      Mouth: Mucous membranes are dry. Eyes:      Extraocular Movements: Extraocular movements intact. Conjunctiva/sclera: Conjunctivae normal.      Pupils: Pupils are equal, round, and reactive to light. Cardiovascular:      Rate and Rhythm: Normal rate and regular rhythm. Heart sounds: No murmur. No friction rub. No gallop.     Pulmonary:      Effort: Pulmonary effort is normal.      Breath sounds: Wheezing (diffuse) present. No rhonchi or rales. Abdominal:      General: Abdomen is flat. Bowel sounds are normal. There is no distension. Palpations: Abdomen is soft. Tenderness: There is no guarding or rebound. Skin:     General: Skin is warm and dry. Neurological:      General: No focal deficit present. Mental Status: He is alert. Mental status is at baseline. Psychiatric:         Mood and Affect: Mood normal.         Behavior: Behavior normal.            MDM/ED Course  ED Medication Orders (From admission, onward)    Start Ordered     Status Ordering Provider    02/13/21 0730 02/13/21 0717  0.9 % sodium chloride bolus  ONCE      Last MAR action: Stopped - by Neftali Juarez on 02/13/21 at [address] [facility], [place]    02/13/21 0730 02/13/21 0717  morphine (PF) injection 4 mg  ONCE      Last MAR action: Given - by Neftali Juarez on 02/13/21 at [address] [facility], [place]    02/13/21 0730 02/13/21 0717  ondansetron (ZOFRAN) injection 4 mg  ONCE      Last MAR action: Given - by Neftali Juarez on 02/13/21 at [address] [facility], [place]    02/13/21 0720 02/13/21 0720  iopamidol (ISOVUE-370) 76 % injection 75 mL  IMG ONCE PRN      Last MAR action: Given - by Elsie Andrews on 02/13/21 at [facility], [address]    02/13/21 0630 02/13/21 0620  0.9 % sodium chloride bolus  ONCE      Last MAR action: Stopped - by Neftali Juarez on 02/13/21 at [address] [place] [address]            Radiology  Ct Abdomen Pelvis W Iv Contrast Additional Contrast? None    Result Date: 2/13/2021  EXAMINATION: CT OF THE ABDOMEN AND PELVIS WITH CONTRAST 2/13/2021 7:40 am TECHNIQUE: CT of the abdomen and pelvis was performed with the administration of intravenous contrast. Multiplanar reformatted images are provided for review. Dose modulation, iterative reconstruction, and/or weight based adjustment of the mA/kV was utilized to reduce the radiation dose to as low as reasonably achievable.  COMPARISON: 08/28/2018 HISTORY: ORDERING SYSTEM PROVIDED HISTORY: bowel perforation TECHNOLOGIST PROVIDED HISTORY: Reason for exam:->bowel perforation Additional Contrast?->None Decision Support Exception->Emergency Medical Condition (MA) Reason for Exam: abd pain and diarrhea x 4 days Acuity: Acute Type of Exam: Initial FINDINGS: Lower Chest: The lung bases are clear. The visualized heart is unremarkable. Organs: The liver is homogeneous. Gallbladder stones are noted dependently in the neck. The pancreas, spleen and adrenal glands are unremarkable. The kidneys enhance symmetrically. There are Bosniak type 1 and 2 cysts in the left kidney, measuring up to 2.1 cm. No follow-up is recommended. GI/Bowel: There is a small hiatal hernia. No dilated loops of small bowel are identified. The appendix is normal.  There is mural thickening of the sigmoid colon and rectum. There is eccentric mural thickening of the left aspect of the rectum, with infiltration of perirectal fat. The mesenteric vasculature enhances in normal fashion. Pelvis: Urinary bladder and prostate gland are unremarkable. Peritoneum/Retroperitoneum: No adenopathy is identified. There is moderate aortoiliac calcification, without aneurysm. A left femoral bypass is partially visualized and lacks enhancement. Bones/Soft Tissues: No acute osseous abnormality is identified. Soft tissues of the abdominal wall are unremarkable. Mural thickening of the sigmoid colon and rectum, consistent with colitis/proctitis. Additionally there is eccentric thickening of the lower, left rectal wall, with infiltration of perirectal fat. Although this may be inflammatory, neoplasm is considered. Follow-up endoscopy is contemplated. Cholelithiasis. Left proximal femoral graft is partially visualized and appears occluded.          Labs  Results for orders placed or performed during the hospital encounter of 02/13/21   Comprehensive Metabolic Panel w/ Reflex to MG   Result Value Ref Range    Sodium 134 (L) 136 - 145 mmol/L    Potassium reflex Magnesium 4.8 3.5 - 5.1 mmol/L    Chloride 98 (L) 99 - 110 mmol/L    CO2 22 21 - 32 mmol/L    Anion Gap 14 3 - 16    Glucose 130 (H) 70 - 99 mg/dL    BUN 13 7 - 20 mg/dL    CREATININE 0.7 (L) 0.8 - 1.3 mg/dL    GFR Non-African American >60 >60    GFR African American >60 >60    Calcium 9.7 8.3 - 10.6 mg/dL    Total Protein 8.0 6.4 - 8.2 g/dL    Albumin 3.8 3.4 - 5.0 g/dL    Albumin/Globulin Ratio 0.9 (L) 1.1 - 2.2    Total Bilirubin 0.3 0.0 - 1.0 mg/dL    Alkaline Phosphatase 122 40 - 129 U/L    ALT 12 10 - 40 U/L    AST 25 15 - 37 U/L    Globulin 4.2 g/dL   CBC Auto Differential   Result Value Ref Range    WBC 11.3 (H) 4.0 - 11.0 K/uL    RBC 4.24 4.20 - 5.90 M/uL    Hemoglobin 11.7 (L) 13.5 - 17.5 g/dL    Hematocrit 35.9 (L) 40.5 - 52.5 %    MCV 84.8 80.0 - 100.0 fL    MCH 27.5 26.0 - 34.0 pg    MCHC 32.4 31.0 - 36.0 g/dL    RDW 15.0 12.4 - 15.4 %    Platelets 513 (H) 688 - 450 K/uL    MPV 7.6 5.0 - 10.5 fL    Neutrophils % 83.4 %    Lymphocytes % 8.4 %    Monocytes % 6.4 %    Eosinophils % 0.5 %    Basophils % 1.3 %    Neutrophils Absolute 9.4 (H) 1.7 - 7.7 K/uL    Lymphocytes Absolute 1.0 1.0 - 5.1 K/uL    Monocytes Absolute 0.7 0.0 - 1.3 K/uL    Eosinophils Absolute 0.1 0.0 - 0.6 K/uL    Basophils Absolute 0.1 0.0 - 0.2 K/uL         - Patient seen and evaluated in room 15.  61 y.o. male presented for dehydration.    - Pertinent old records reviewed. - Patient was given 2L NS in the ED. Upon reassessment, patient felt improved. - Diagnostic studies reviewed. CT did not show worsening of findings compared to most recent CT at [facility].  - I discussed the results with patient.      - Return precautions also discussed. patient verbalized understanding of care plan and agreed to follow-up with Dr. Shameka Esquivel as advised. Clinical Impression:  1. Dehydration          Disposition:  Discharge to home in good condition.     Blood pressure (!) 157/82, pulse 97, temperature 97.5 °F (36.4 °C), temperature source

## 2021-02-13 NOTE — ED PROVIDER NOTES
I independently performed a history and physical on Jono Waldrop. All diagnostic, treatment, and disposition decisions were made by myself in conjunction with the resident physician. For further details of David Subramanian emergency department encounter, please see the resident physician's documentation. Patient reports that for the last several days he has had some general nausea and some loose stools and is concerned he might be dehydrated because he also has dry mouth. He has had slightly decreased oral intake and has some rectal pain with defecation. He has a history of rectal cancer and is being treated with monoclonal antibody therapy his last treatment was several weeks ago. He also had a CAT scan in December that showed contained perforation in that area as well. He denies any fevers, chills or sweats or any URI symptoms or any chest pain or shortness of breath. On exam his abdomen is benign heart regular rate and rhythm and lungs clear to auscultation bilaterally. The total Critical Care time is 35 minutes which excludes separately billable procedures. The critical care was concerning IV fluid bolus for dehydration. This time is exclusive of any time documented by any other providers.     Results for orders placed or performed during the hospital encounter of 02/13/21   Comprehensive Metabolic Panel w/ Reflex to MG   Result Value Ref Range    Sodium 134 (L) 136 - 145 mmol/L    Potassium reflex Magnesium 4.8 3.5 - 5.1 mmol/L    Chloride 98 (L) 99 - 110 mmol/L    CO2 22 21 - 32 mmol/L    Anion Gap 14 3 - 16    Glucose 130 (H) 70 - 99 mg/dL    BUN 13 7 - 20 mg/dL    CREATININE 0.7 (L) 0.8 - 1.3 mg/dL    GFR Non-African American >60 >60    GFR African American >60 >60    Calcium 9.7 8.3 - 10.6 mg/dL    Total Protein 8.0 6.4 - 8.2 g/dL    Albumin 3.8 3.4 - 5.0 g/dL    Albumin/Globulin Ratio 0.9 (L) 1.1 - 2.2    Total Bilirubin 0.3 0.0 - 1.0 mg/dL    Alkaline Phosphatase 122 40 - 129 U/L ALT 12 10 - 40 U/L    AST 25 15 - 37 U/L    Globulin 4.2 g/dL   CBC Auto Differential   Result Value Ref Range    WBC 11.3 (H) 4.0 - 11.0 K/uL    RBC 4.24 4.20 - 5.90 M/uL    Hemoglobin 11.7 (L) 13.5 - 17.5 g/dL    Hematocrit 35.9 (L) 40.5 - 52.5 %    MCV 84.8 80.0 - 100.0 fL    MCH 27.5 26.0 - 34.0 pg    MCHC 32.4 31.0 - 36.0 g/dL    RDW 15.0 12.4 - 15.4 %    Platelets 541 (H) 449 - 450 K/uL    MPV 7.6 5.0 - 10.5 fL    Neutrophils % 83.4 %    Lymphocytes % 8.4 %    Monocytes % 6.4 %    Eosinophils % 0.5 %    Basophils % 1.3 %    Neutrophils Absolute 9.4 (H) 1.7 - 7.7 K/uL    Lymphocytes Absolute 1.0 1.0 - 5.1 K/uL    Monocytes Absolute 0.7 0.0 - 1.3 K/uL    Eosinophils Absolute 0.1 0.0 - 0.6 K/uL    Basophils Absolute 0.1 0.0 - 0.2 K/uL     Ct Abdomen Pelvis W Iv Contrast Additional Contrast? None  Mural thickening of the sigmoid colon and rectum, consistent with colitis/proctitis. Additionally there is eccentric thickening of the lower, left rectal wall, with infiltration of perirectal fat. Although this may be inflammatory, neoplasm is considered. Follow-up endoscopy is contemplated. Cholelithiasis. Left proximal femoral graft is partially visualized and appears occluded.         Eligio Mccabe MD  02/13/21 [id number]

## 2021-02-13 NOTE — ED NOTES

## 2022-11-19 ENCOUNTER — HOSPITAL ENCOUNTER (EMERGENCY)
Age: 62
Discharge: ANOTHER HOSPITAL | End: 2022-11-20
Attending: EMERGENCY MEDICINE
Payer: MEDICARE

## 2022-11-19 ENCOUNTER — APPOINTMENT (OUTPATIENT)
Dept: CT IMAGING | Age: 62
End: 2022-11-19
Payer: MEDICARE

## 2022-11-19 DIAGNOSIS — I70.209 ARTERIAL OCCLUSION, LOWER EXTREMITY (HCC): Primary | ICD-10-CM

## 2022-11-19 LAB
A/G RATIO: 1.1 (ref 1.1–2.2)
ALBUMIN SERPL-MCNC: 3.3 G/DL (ref 3.4–5)
ALP BLD-CCNC: 107 U/L (ref 40–129)
ALT SERPL-CCNC: 37 U/L (ref 10–40)
ANION GAP SERPL CALCULATED.3IONS-SCNC: 13 MMOL/L (ref 3–16)
APTT: 39.9 SEC (ref 23–34.3)
AST SERPL-CCNC: 68 U/L (ref 15–37)
BASOPHILS ABSOLUTE: 0.1 K/UL (ref 0–0.2)
BASOPHILS RELATIVE PERCENT: 0.4 %
BILIRUB SERPL-MCNC: 0.4 MG/DL (ref 0–1)
BUN BLDV-MCNC: 15 MG/DL (ref 7–20)
CALCIUM SERPL-MCNC: 9.3 MG/DL (ref 8.3–10.6)
CHLORIDE BLD-SCNC: 94 MMOL/L (ref 99–110)
CO2: 26 MMOL/L (ref 21–32)
CREAT SERPL-MCNC: 0.6 MG/DL (ref 0.8–1.3)
EOSINOPHILS ABSOLUTE: 0 K/UL (ref 0–0.6)
EOSINOPHILS RELATIVE PERCENT: 0.1 %
GFR SERPL CREATININE-BSD FRML MDRD: >60 ML/MIN/{1.73_M2}
GLUCOSE BLD-MCNC: 98 MG/DL (ref 70–99)
HCT VFR BLD CALC: 30.6 % (ref 40.5–52.5)
HEMOGLOBIN: 9.8 G/DL (ref 13.5–17.5)
LACTIC ACID: 1.6 MMOL/L (ref 0.4–2)
LYMPHOCYTES ABSOLUTE: 1.4 K/UL (ref 1–5.1)
LYMPHOCYTES RELATIVE PERCENT: 7.1 %
MCH RBC QN AUTO: 27.1 PG (ref 26–34)
MCHC RBC AUTO-ENTMCNC: 31.9 G/DL (ref 31–36)
MCV RBC AUTO: 84.9 FL (ref 80–100)
MONOCYTES ABSOLUTE: 1.3 K/UL (ref 0–1.3)
MONOCYTES RELATIVE PERCENT: 6.8 %
NEUTROPHILS ABSOLUTE: 16.4 K/UL (ref 1.7–7.7)
NEUTROPHILS RELATIVE PERCENT: 85.6 %
PDW BLD-RTO: 26 % (ref 12.4–15.4)
PLATELET # BLD: 442 K/UL (ref 135–450)
PLATELET SLIDE REVIEW: ADEQUATE
PMV BLD AUTO: 7.8 FL (ref 5–10.5)
POTASSIUM REFLEX MAGNESIUM: 3.8 MMOL/L (ref 3.5–5.1)
RBC # BLD: 3.61 M/UL (ref 4.2–5.9)
SLIDE REVIEW: ABNORMAL
SODIUM BLD-SCNC: 133 MMOL/L (ref 136–145)
TOTAL PROTEIN: 6.3 G/DL (ref 6.4–8.2)
WBC # BLD: 19.2 K/UL (ref 4–11)

## 2022-11-19 PROCEDURE — 96375 TX/PRO/DX INJ NEW DRUG ADDON: CPT

## 2022-11-19 PROCEDURE — 83605 ASSAY OF LACTIC ACID: CPT

## 2022-11-19 PROCEDURE — 6360000004 HC RX CONTRAST MEDICATION: Performed by: EMERGENCY MEDICINE

## 2022-11-19 PROCEDURE — 99285 EMERGENCY DEPT VISIT HI MDM: CPT

## 2022-11-19 PROCEDURE — 96365 THER/PROPH/DIAG IV INF INIT: CPT

## 2022-11-19 PROCEDURE — 2500000003 HC RX 250 WO HCPCS: Performed by: EMERGENCY MEDICINE

## 2022-11-19 PROCEDURE — 75635 CT ANGIO ABDOMINAL ARTERIES: CPT

## 2022-11-19 PROCEDURE — 85730 THROMBOPLASTIN TIME PARTIAL: CPT

## 2022-11-19 PROCEDURE — 85025 COMPLETE CBC W/AUTO DIFF WBC: CPT

## 2022-11-19 PROCEDURE — 6360000002 HC RX W HCPCS

## 2022-11-19 PROCEDURE — 80053 COMPREHEN METABOLIC PANEL: CPT

## 2022-11-19 PROCEDURE — 6360000002 HC RX W HCPCS: Performed by: EMERGENCY MEDICINE

## 2022-11-19 RX ORDER — HEPARIN SODIUM 1000 [USP'U]/ML
1800 INJECTION, SOLUTION INTRAVENOUS; SUBCUTANEOUS PRN
Status: DISCONTINUED | OUTPATIENT
Start: 2022-11-19 | End: 2022-11-20 | Stop reason: HOSPADM

## 2022-11-19 RX ORDER — MORPHINE SULFATE 2 MG/ML
2 INJECTION, SOLUTION INTRAMUSCULAR; INTRAVENOUS
Status: COMPLETED | OUTPATIENT
Start: 2022-11-19 | End: 2022-11-19

## 2022-11-19 RX ORDER — HEPARIN SODIUM 1000 [USP'U]/ML
3600 INJECTION, SOLUTION INTRAVENOUS; SUBCUTANEOUS PRN
Status: DISCONTINUED | OUTPATIENT
Start: 2022-11-19 | End: 2022-11-20 | Stop reason: HOSPADM

## 2022-11-19 RX ORDER — HEPARIN SODIUM 10000 [USP'U]/100ML
820 INJECTION, SOLUTION INTRAVENOUS CONTINUOUS
Status: DISCONTINUED | OUTPATIENT
Start: 2022-11-19 | End: 2022-11-20 | Stop reason: HOSPADM

## 2022-11-19 RX ORDER — KETOROLAC TROMETHAMINE 30 MG/ML
15 INJECTION, SOLUTION INTRAMUSCULAR; INTRAVENOUS ONCE
Status: DISCONTINUED | OUTPATIENT
Start: 2022-11-19 | End: 2022-11-19

## 2022-11-19 RX ORDER — HEPARIN SODIUM 1000 [USP'U]/ML
3600 INJECTION, SOLUTION INTRAVENOUS; SUBCUTANEOUS ONCE
Status: COMPLETED | OUTPATIENT
Start: 2022-11-19 | End: 2022-11-19

## 2022-11-19 RX ADMIN — MORPHINE SULFATE 2 MG: 2 INJECTION, SOLUTION INTRAMUSCULAR; INTRAVENOUS at 22:42

## 2022-11-19 RX ADMIN — HYDROMORPHONE HYDROCHLORIDE 0.5 MG: 1 INJECTION, SOLUTION INTRAMUSCULAR; INTRAVENOUS; SUBCUTANEOUS at 23:28

## 2022-11-19 RX ADMIN — HEPARIN SODIUM 820 UNITS/HR: 10000 INJECTION, SOLUTION INTRAVENOUS at 23:53

## 2022-11-19 RX ADMIN — HEPARIN SODIUM 3600 UNITS: 1000 INJECTION INTRAVENOUS; SUBCUTANEOUS at 23:52

## 2022-11-19 RX ADMIN — IOPAMIDOL 75 ML: 755 INJECTION, SOLUTION INTRAVENOUS at 23:46

## 2022-11-19 ASSESSMENT — PAIN SCALES - GENERAL
PAINLEVEL_OUTOF10: 10

## 2022-11-19 ASSESSMENT — PAIN - FUNCTIONAL ASSESSMENT: PAIN_FUNCTIONAL_ASSESSMENT: 0-10

## 2022-11-20 VITALS
OXYGEN SATURATION: 96 % | HEIGHT: 64 IN | BODY MASS INDEX: 17.07 KG/M2 | HEART RATE: 93 BPM | DIASTOLIC BLOOD PRESSURE: 76 MMHG | SYSTOLIC BLOOD PRESSURE: 147 MMHG | RESPIRATION RATE: 21 BRPM | TEMPERATURE: 97.8 F | WEIGHT: 100 LBS

## 2022-11-20 ASSESSMENT — ENCOUNTER SYMPTOMS: TACHYPNEA: 1

## 2022-11-20 NOTE — ED PROVIDER NOTES
I independently performed a history and physical on Jono Waldrop. All diagnostic, treatment, and disposition decisions were made by myself in conjunction with the residents. See resident's note for complete documentation for this encounter. I reviewed pertinent nurse's notes, triage notes, vital signs, past medical history, family and social history, medications, and allergies. Complete review of systems was conducted by the resident and/or myself. Review of systems is negative except as documented in the history of present illness. EKG: Twelve-lead EKG as read and interpreted by myself shows:    Patient was placed on cardiac and blood pressure monitoring and interpreted by myself as follows:    MDM:    This is an adult male who comes in with pain of his left lower extremity. On exam the patient appears to be in acute painful distress though he is nontoxic-appearing he appears chronically ill. His left lower extremity is pale it is cold it is tender. I am unable to appreciate any pulses of the left lower extremity. There is slightly purplish discoloration to the foot. Capillary refill is delayed. Mild edema of the left foot. Decreased range of motion. Based on the patient's history of vascular occlusion I am concerned that he has an acute arterial occlusion of his left lower extremity. Bedside ED ultrasound is performed and I do not appreciate any vascular flow in the dorsalis pedis popliteal posterior tibialis and femoral regions. CTA with runoff is ordered. The patient is first given morphine for his pain he complains that this is not enough. Patient is then given Dilaudid. He CT is read and interpreted by the radiologist and it does confirm my suspicion. A consult is placed to the patient's vascular surgeon at [facility].  I spoke to the the provider who recommended that patient be started on a heparin bolus and drip and he had excepted the patient has a transfer.   Patient is agreeable with this plan. Total Critical Care time was 30 minutes, excluding separately reportable procedures. There was a high probability of clinically significant/life threatening deterioration in the patient's condition which required my urgent intervention. FINAL IMPRESSION     1. Arterial occlusion, lower extremity [facility])         DISPOSITION/FOLLOW-UP PLAN    DISPOSITION Decision To Transfer 11/19/2022 11:32:18 PM      No follow-up provider specified. Electronically signed by: Rola Cruz M.D.   I am the primary physician of record       Gauri Villa MD  11/20/22 [id number]

## 2022-11-20 NOTE — ED PROVIDER NOTES
CHIEF COMPLAINT  Leg Pain (Left lower leg pain, stents placed, normally goes to [address] )      HISTORY OF PRESENT ILLNESS  Edis De La Rosa is a 58 y.o. male with a history of severe peripheral artery disease status post multiple bypass grafting and stenting in the left lower extremity, anal cancer on chemotherapy who presents to the ED complaining of left leg pain. Patient presenting to ED with concerns of left leg pain over the past week. The leg pain is constant and he does feel extremely cold. Patient does take oxycodone for pain at home however this has done nothing. He notes an extensive history of peripheral artery disease ever since diagnosis of cancer. He did have surgery on October 21, 2022 where they performed angioplasty of the left external iliac, common femoral stents, femoral popliteal bypass grafting and further angioplasties. States he is well-known by the surgeon who performed his procedure at [facility].  He has been transitioned to Lovenox twice daily and daily Plavix, for which he has been taking. Patient is currently on chemotherapy weekly for his metastatic anal cancer. No other complaints, modifying factors or associated symptoms. I have reviewed the following from the nursing documentation. Past Medical History:   Diagnosis Date    Cancer [facility]) 2015    rectal cancer     No past surgical history on file. No family history on file.   Social History     Socioeconomic History    Marital status: Single     Spouse name: Not on file    Number of children: Not on file    Years of education: Not on file    Highest education level: Not on file   Occupational History    Not on file   Tobacco Use    Smoking status: Every Day     Packs/day: 1.00     Types: Cigarettes    Smokeless tobacco: Never   Vaping Use    Vaping Use: Never used   Substance and Sexual Activity    Alcohol use: No    Drug use: No    Sexual activity: Not on file   Other Topics Concern    Not on file   Social History Narrative    Not on file     Social Determinants of Health     Financial Resource Strain: Not on file   Food Insecurity: Not on file   Transportation Needs: Not on file   Physical Activity: Not on file   Stress: Not on file   Social Connections: Not on file   Intimate Partner Violence: Not on file   Housing Stability: Not on file     Current Facility-Administered Medications   Medication Dose Route Frequency Provider Last Rate Last Admin    heparin (porcine) injection 3,600 Units  3,600 Units IntraVENous PRN Alma Bravo MD        heparin (porcine) injection 1,800 Units  1,800 Units IntraVENous PRN Alma Bravo MD        heparin 25,000 unit in sodium chloride 0.45% 250 mL (premix) infusion  820 Units/hr IntraVENous Continuous Alma Bravo MD 8.2 mL/hr at 11/19/22 2353 820 Units/hr at 11/19/22 2353     Current Outpatient Medications   Medication Sig Dispense Refill    acetaminophen (TYLENOL) 325 MG tablet Take 650 mg by mouth every 6 hours      clopidogrel (PLAVIX) 75 MG tablet Take 75 mg by mouth daily      oxyCODONE (ROXICODONE) 5 MG immediate release tablet       ondansetron (ZOFRAN ODT) 4 MG disintegrating tablet Take 1 tablet by mouth every 8 hours as needed for Nausea or Vomiting 30 tablet 0     Allergies   Allergen Reactions    Vicodin [Hydrocodone-Acetaminophen] Nausea And Vomiting       REVIEW OF SYSTEMS  Positive and pertinent negatives as per HPI. All other systems were reviewed and are negative. PHYSICAL EXAM  BP (!) 147/76   Pulse 93   Temp 97.8 °F (36.6 °C) (Oral)   Resp 21   Ht 5' 4\" (1.626 m)   Wt 100 lb (45.4 kg)   SpO2 96%   BMI 17.16 kg/m²   GENERAL APPEARANCE: Awake and alert. Cooperative. HEAD: Normocephalic. Atraumatic. EYES: PERRL. EOM's grossly intact. ENT: Mucous membranes are moist.   NECK: Supple, trachea midline. No significant lymphadenopathy  HEART: RRR. No harsh murmurs. Intact radial pulses 2+ bilaterally.    LUNGS: Respirations unlabored without accessory muscle use. CTAB. Good air exchange. No wheezes, rales, or rhonchi. ABDOMEN: Soft. Non-distended. Non-tender. No guarding or rebound. EXTREMITIES: Patient left lower extremity cool to the touch. No swelling. Unable to palpate pulses in the left lower extremity. Tender to touch, tender to move. Right lower extremity warm to touch, pulses palpable, no tenderness. SKIN: Warm and dry. No acute rashes. NEUROLOGICAL: Alert and oriented X 3. CN II-XII intact. No gross facial drooping. PSYCHIATRIC: Normal mood and affect. LABS  I have reviewed all labs for this visit.    Results for orders placed or performed during the hospital encounter of 11/19/22   CMP w/ Reflex to MG   Result Value Ref Range    Sodium 133 (L) 136 - 145 mmol/L    Potassium reflex Magnesium 3.8 3.5 - 5.1 mmol/L    Chloride 94 (L) 99 - 110 mmol/L    CO2 26 21 - 32 mmol/L    Anion Gap 13 3 - 16    Glucose 98 70 - 99 mg/dL    BUN 15 7 - 20 mg/dL    Creatinine 0.6 (L) 0.8 - 1.3 mg/dL    Est, Glom Filt Rate >60 >60    Calcium 9.3 8.3 - 10.6 mg/dL    Total Protein 6.3 (L) 6.4 - 8.2 g/dL    Albumin 3.3 (L) 3.4 - 5.0 g/dL    Albumin/Globulin Ratio 1.1 1.1 - 2.2    Total Bilirubin 0.4 0.0 - 1.0 mg/dL    Alkaline Phosphatase 107 40 - 129 U/L    ALT 37 10 - 40 U/L    AST 68 (H) 15 - 37 U/L   CBC with Auto Differential   Result Value Ref Range    WBC 19.2 (H) 4.0 - 11.0 K/uL    RBC 3.61 (L) 4.20 - 5.90 M/uL    Hemoglobin 9.8 (L) 13.5 - 17.5 g/dL    Hematocrit 30.6 (L) 40.5 - 52.5 %    MCV 84.9 80.0 - 100.0 fL    MCH 27.1 26.0 - 34.0 pg    MCHC 31.9 31.0 - 36.0 g/dL    RDW 26.0 (H) 12.4 - 15.4 %    Platelets 312 487 - 764 K/uL    MPV 7.8 5.0 - 10.5 fL    PLATELET SLIDE REVIEW Adequate     SLIDE REVIEW see below     Neutrophils % 85.6 %    Lymphocytes % 7.1 %    Monocytes % 6.8 %    Eosinophils % 0.1 %    Basophils % 0.4 %    Neutrophils Absolute 16.4 (H) 1.7 - 7.7 K/uL    Lymphocytes Absolute 1.4 1.0 - 5.1 K/uL    Monocytes Absolute 1.3 0.0 - 1.3 K/uL    Eosinophils Absolute 0.0 0.0 - 0.6 K/uL    Basophils Absolute 0.1 0.0 - 0.2 K/uL   Lactic Acid   Result Value Ref Range    Lactic Acid 1.6 0.4 - 2.0 mmol/L   APTT   Result Value Ref Range    aPTT 39.9 (H) 23.0 - 34.3 sec         RADIOLOGY  X-RAYS:  I have reviewed radiologic plain film image(s). ALL OTHER NON-PLAIN FILM IMAGES SUCH AS CT, ULTRASOUND AND MRI HAVE BEEN READ BY THE RADIOLOGIST. CTA ABDOMINAL AORTA W BILAT RUNOFF W CONTRAST   Final Result   1. Occlusion of the left external iliac artery stent, common femoral artery   and SFA stent. Small caliber collateral flow from the left internal iliacs   since supply the deep femoral arterial system and faintly reconstitute the   popliteal artery and proximal runoff vessels, which are not visibly opacified   beyond the proximal lower leg. 2.  Occlusion of the right SFA with distal reconstitution via the deep   femoral arterial system and appropriate three-vessel runoff to the foot. 3.  Sequela of previously demonstrated rectal mass and pelvic surgery, as   above. 4.  Distended gallbladder with small stones. No CT evidence for acute   cholecystitis. Findings were discussed with Johnny Gayle at 12:41 am on 11/20/2022. No results found. Critical Care: Total critical care time is 30 minutes, which excludes separately billable procedures and updating family. Time spent is specifically for management of the presenting complaint and symptoms initially, direct bedside care, reevaluation, review of records, and consultation. There was a high probability of clinically significant life-threatening deterioration in the patient's condition, which required my urgent intervention.          PROCEDURES:    During the patient's ED course, the patient was given:  Medications   heparin (porcine) injection 3,600 Units (has no administration in time range)   heparin (porcine) injection 1,800 Units (has no administration in time range)   heparin 25,000 unit in sodium chloride 0.45% 250 mL (premix) infusion (820 Units/hr IntraVENous New Bag 11/19/22 2353)   morphine (PF) injection 2 mg (2 mg IntraVENous Given 11/19/22 2242)   HYDROmorphone (DILAUDID) injection 0.5 mg (0.5 mg IntraVENous Given 11/19/22 2328)   iopamidol (ISOVUE-370) 76 % injection 75 mL (75 mLs IntraVENous Given 11/19/22 2346)   heparin (porcine) injection 3,600 Units (3,600 Units IntraVENous Given 11/19/22 2352)        ED COURSE/MDM  Patient seen and evaluated. Old records reviewed. Labs and imaging reviewed and results discussed with patient. Patient presented to the ED due to concerns of left lower leg pain. On clinical presentation there was concern for acute limb ischemia as pulses were not able to be found on the left leg along with a very cold sensation. An ultrasound was used to help determine any flow, and we were unable to find any in the entire left lower extremity. CTA abdominal aorta with runoff was obtained showing occlusion of the left external iliac artery stent common femoral artery and SFA stent along with occlusion of the right SFA with distal reconstitution via the deep femoral artery system. Dr. Allyn Hernandez discussed the patient with his surgeon at [facility], found out that he is very well-known there and has pretty extensive history of occlusions in lower left extremities. He has been offered hospice multiple times due to this as well as combination of his cancer. At this time patient will be placed on heparin drip and be transferred over to [facility] for further treatment. He already has a direct admission in place. He has been given pain medication including morphine and Dilaudid. Patient was given scripts for the following medications. I counseled patient how to take these medications.    New Prescriptions    No medications on file CLINICAL IMPRESSION  No diagnosis found. Blood pressure (!) 147/76, pulse 93, temperature 97.8 °F (36.6 °C), temperature source Oral, resp. rate 21, height 5' 4\" (1.626 m), weight 100 lb (45.4 kg), SpO2 96 %. DISPOSITION  Benito Bustillo was transferred in stable condition. This chart was generated in part by using Dragon Dictation system and may contain errors related to that system including errors in grammar, punctuation, and spelling, as well as words and phrases that may be inappropriate. If there are any questions or concerns please feel free to contact the dictating provider for clarification.     Rajni Camara, DO  PGY1 Family Medicine Resident  Mark 68, DO  Resident  11/20/22 7042

## 2022-11-20 NOTE — ED NOTES
Report given to [facility] at [address] [facility]. Report also given to EMS crew     Nyasia Dave RN  11/20/22 3880

## 2022-11-20 NOTE — CONSULTS
Pharmacy to Manage Heparin Infusion per Hospital Policy    Diagnosis: Possible DVT on LLE  Patient weight = 45.4 kg (will use adjusted wt if actual body weight > 120% ideal body weight). Baseline aPTT = pending    History of Anti-Xa Inhibitors (Apixaban, Rivaroxaban, Edoxaban)?: Yes, patient regularly takes Eliquis. Unclear when last dose was given. Will use aPTT monitoring until 72 hour wash-out of Eliquis is complete    Heparin (weight-based) Infusion: VTE/DVT/PE  Heparin 80 units/kg IVP bolus (max 10,000 units) followed by Heparin infusion at 18 units/kg/hr (recommended max initial rate: 2100 units/hr). Recheck anti-Xa (unless aPTT being used) in 6 hours. Goal anti-Xa 0.3-0.7 IU/mL  Goal aPTT =  seconds.     Plan:  High-Dose Heparin Drip  Plan: Per pharmacy protocol, we will give a bolus dose of 3600 units and start heparin drip rate to 820 units/hr    Next aPTT Level: 11/20 @ 0600  Adrián Redmond, PharmD 11/19/2022 11:39 PM

## 2022-11-20 NOTE — CONSULTS
Placed a consult with vascular surgery @ 1   RE: LLE arterial occlusion per Domingo Dudley, MD Dr. Jacqulyne Libman called back @ [phone]

## 2022-11-20 NOTE — ED NOTES
2326 - Dr. Maryjo Brunner spoke to Dr. Kalpana Simms, vascular surgery   2332 - Placed call to [facility] to initiate transfer to [address] called back with room and N2N number  [id number] - [facility] ETA @ [address]  11/20/22 0020

## 2023-01-06 ENCOUNTER — HOSPITAL ENCOUNTER (EMERGENCY)
Age: 63
Discharge: HOSPICE | End: 2023-01-07
Attending: EMERGENCY MEDICINE
Payer: MEDICARE

## 2023-01-06 DIAGNOSIS — D72.829 LEUKOCYTOSIS, UNSPECIFIED TYPE: ICD-10-CM

## 2023-01-06 DIAGNOSIS — I99.8 ISCHEMIC LEG: Primary | ICD-10-CM

## 2023-01-06 DIAGNOSIS — Z85.9 HISTORY OF CANCER: ICD-10-CM

## 2023-01-06 LAB
A/G RATIO: 0.7 (ref 1.1–2.2)
ALBUMIN SERPL-MCNC: 3.5 G/DL (ref 3.4–5)
ALP BLD-CCNC: 89 U/L (ref 40–129)
ALT SERPL-CCNC: 48 U/L (ref 10–40)
ANION GAP SERPL CALCULATED.3IONS-SCNC: 15 MMOL/L (ref 3–16)
AST SERPL-CCNC: 113 U/L (ref 15–37)
BASOPHILS ABSOLUTE: 0.1 K/UL (ref 0–0.2)
BASOPHILS RELATIVE PERCENT: 0.4 %
BILIRUB SERPL-MCNC: 0.3 MG/DL (ref 0–1)
BUN BLDV-MCNC: 47 MG/DL (ref 7–20)
CALCIUM SERPL-MCNC: 9.8 MG/DL (ref 8.3–10.6)
CHLORIDE BLD-SCNC: 92 MMOL/L (ref 99–110)
CO2: 26 MMOL/L (ref 21–32)
CREAT SERPL-MCNC: 0.9 MG/DL (ref 0.8–1.3)
EOSINOPHILS ABSOLUTE: 0 K/UL (ref 0–0.6)
EOSINOPHILS RELATIVE PERCENT: 0 %
GFR SERPL CREATININE-BSD FRML MDRD: >60 ML/MIN/{1.73_M2}
GLUCOSE BLD-MCNC: 98 MG/DL (ref 70–99)
HCT VFR BLD CALC: 37.4 % (ref 40.5–52.5)
HEMOGLOBIN: 11.8 G/DL (ref 13.5–17.5)
LYMPHOCYTES ABSOLUTE: 1 K/UL (ref 1–5.1)
LYMPHOCYTES RELATIVE PERCENT: 4.4 %
MCH RBC QN AUTO: 26.3 PG (ref 26–34)
MCHC RBC AUTO-ENTMCNC: 31.6 G/DL (ref 31–36)
MCV RBC AUTO: 83.2 FL (ref 80–100)
MONOCYTES ABSOLUTE: 0.8 K/UL (ref 0–1.3)
MONOCYTES RELATIVE PERCENT: 3.7 %
NEUTROPHILS ABSOLUTE: 19.6 K/UL (ref 1.7–7.7)
NEUTROPHILS RELATIVE PERCENT: 91.5 %
PDW BLD-RTO: 18.6 % (ref 12.4–15.4)
PLATELET # BLD: 354 K/UL (ref 135–450)
PMV BLD AUTO: 9 FL (ref 5–10.5)
POTASSIUM REFLEX MAGNESIUM: 4.8 MMOL/L (ref 3.5–5.1)
RBC # BLD: 4.49 M/UL (ref 4.2–5.9)
SODIUM BLD-SCNC: 133 MMOL/L (ref 136–145)
TOTAL PROTEIN: 8.3 G/DL (ref 6.4–8.2)
WBC # BLD: 21.4 K/UL (ref 4–11)

## 2023-01-06 PROCEDURE — 83605 ASSAY OF LACTIC ACID: CPT

## 2023-01-06 PROCEDURE — 85025 COMPLETE CBC W/AUTO DIFF WBC: CPT

## 2023-01-06 PROCEDURE — 84145 PROCALCITONIN (PCT): CPT

## 2023-01-06 PROCEDURE — 36415 COLL VENOUS BLD VENIPUNCTURE: CPT

## 2023-01-06 PROCEDURE — 87040 BLOOD CULTURE FOR BACTERIA: CPT

## 2023-01-06 PROCEDURE — 96365 THER/PROPH/DIAG IV INF INIT: CPT

## 2023-01-06 PROCEDURE — 80053 COMPREHEN METABOLIC PANEL: CPT

## 2023-01-06 PROCEDURE — 99285 EMERGENCY DEPT VISIT HI MDM: CPT

## 2023-01-06 PROCEDURE — 86140 C-REACTIVE PROTEIN: CPT

## 2023-01-06 PROCEDURE — 96375 TX/PRO/DX INJ NEW DRUG ADDON: CPT

## 2023-01-06 PROCEDURE — 85652 RBC SED RATE AUTOMATED: CPT

## 2023-01-06 PROCEDURE — 96361 HYDRATE IV INFUSION ADD-ON: CPT

## 2023-01-06 RX ORDER — HYDROMORPHONE HYDROCHLORIDE 4 MG/1
4 TABLET ORAL
COMMUNITY

## 2023-01-06 RX ORDER — AMOXICILLIN 250 MG
1 CAPSULE ORAL DAILY PRN
COMMUNITY

## 2023-01-06 RX ORDER — LORAZEPAM 2 MG/1
2 TABLET ORAL
COMMUNITY

## 2023-01-06 RX ORDER — LORAZEPAM 1 MG/1
1 TABLET ORAL
COMMUNITY

## 2023-01-06 RX ORDER — HYDROMORPHONE HYDROCHLORIDE 8 MG/1
8 TABLET ORAL
COMMUNITY

## 2023-01-06 ASSESSMENT — ENCOUNTER SYMPTOMS
SORE THROAT: 0
FACIAL SWELLING: 0
ABDOMINAL PAIN: 0
COLOR CHANGE: 0
SHORTNESS OF BREATH: 0
RHINORRHEA: 0

## 2023-01-06 ASSESSMENT — PAIN SCALES - GENERAL: PAINLEVEL_OUTOF10: 10

## 2023-01-06 ASSESSMENT — PAIN DESCRIPTION - LOCATION: LOCATION: LEG

## 2023-01-06 ASSESSMENT — PAIN - FUNCTIONAL ASSESSMENT: PAIN_FUNCTIONAL_ASSESSMENT: 0-10

## 2023-01-06 ASSESSMENT — PAIN DESCRIPTION - DESCRIPTORS: DESCRIPTORS: SHARP;SHOOTING

## 2023-01-06 ASSESSMENT — PAIN DESCRIPTION - ORIENTATION: ORIENTATION: LEFT

## 2023-01-07 ENCOUNTER — APPOINTMENT (OUTPATIENT)
Dept: GENERAL RADIOLOGY | Age: 63
End: 2023-01-07
Payer: MEDICARE

## 2023-01-07 VITALS
OXYGEN SATURATION: 97 % | WEIGHT: 95 LBS | SYSTOLIC BLOOD PRESSURE: 124 MMHG | HEART RATE: 94 BPM | TEMPERATURE: 98.8 F | BODY MASS INDEX: 16.31 KG/M2 | RESPIRATION RATE: 18 BRPM | DIASTOLIC BLOOD PRESSURE: 72 MMHG

## 2023-01-07 LAB
C-REACTIVE PROTEIN: 68.7 MG/L (ref 0–5.1)
LACTIC ACID, SEPSIS: 0.8 MMOL/L (ref 0.4–1.9)
LACTIC ACID, SEPSIS: 2.1 MMOL/L (ref 0.4–1.9)
PROCALCITONIN: 0.11 NG/ML (ref 0–0.15)
SEDIMENTATION RATE, ERYTHROCYTE: 86 MM/HR (ref 0–20)

## 2023-01-07 PROCEDURE — 73630 X-RAY EXAM OF FOOT: CPT

## 2023-01-07 PROCEDURE — 6360000002 HC RX W HCPCS: Performed by: NURSE PRACTITIONER

## 2023-01-07 PROCEDURE — 2580000003 HC RX 258: Performed by: NURSE PRACTITIONER

## 2023-01-07 PROCEDURE — 6370000000 HC RX 637 (ALT 250 FOR IP): Performed by: STUDENT IN AN ORGANIZED HEALTH CARE EDUCATION/TRAINING PROGRAM

## 2023-01-07 PROCEDURE — 96375 TX/PRO/DX INJ NEW DRUG ADDON: CPT

## 2023-01-07 PROCEDURE — 6360000002 HC RX W HCPCS: Performed by: EMERGENCY MEDICINE

## 2023-01-07 PROCEDURE — 36415 COLL VENOUS BLD VENIPUNCTURE: CPT

## 2023-01-07 PROCEDURE — 83605 ASSAY OF LACTIC ACID: CPT

## 2023-01-07 PROCEDURE — 6370000000 HC RX 637 (ALT 250 FOR IP): Performed by: EMERGENCY MEDICINE

## 2023-01-07 PROCEDURE — 73590 X-RAY EXAM OF LOWER LEG: CPT

## 2023-01-07 PROCEDURE — 96361 HYDRATE IV INFUSION ADD-ON: CPT

## 2023-01-07 PROCEDURE — 96365 THER/PROPH/DIAG IV INF INIT: CPT

## 2023-01-07 PROCEDURE — 2580000003 HC RX 258: Performed by: EMERGENCY MEDICINE

## 2023-01-07 RX ORDER — HYDROMORPHONE HYDROCHLORIDE 2 MG/1
4 TABLET ORAL ONCE
Status: COMPLETED | OUTPATIENT
Start: 2023-01-07 | End: 2023-01-07

## 2023-01-07 RX ORDER — 0.9 % SODIUM CHLORIDE 0.9 %
500 INTRAVENOUS SOLUTION INTRAVENOUS ONCE
Status: COMPLETED | OUTPATIENT
Start: 2023-01-07 | End: 2023-01-07

## 2023-01-07 RX ORDER — 0.9 % SODIUM CHLORIDE 0.9 %
1000 INTRAVENOUS SOLUTION INTRAVENOUS ONCE
Status: COMPLETED | OUTPATIENT
Start: 2023-01-07 | End: 2023-01-07

## 2023-01-07 RX ORDER — LORAZEPAM 1 MG/1
1 TABLET ORAL ONCE
Status: COMPLETED | OUTPATIENT
Start: 2023-01-07 | End: 2023-01-07

## 2023-01-07 RX ADMIN — LORAZEPAM 1 MG: 1 TABLET ORAL at 01:23

## 2023-01-07 RX ADMIN — SODIUM CHLORIDE 1000 ML: 9 INJECTION, SOLUTION INTRAVENOUS at 00:45

## 2023-01-07 RX ADMIN — HYDROMORPHONE HYDROCHLORIDE 4 MG: 2 TABLET ORAL at 09:39

## 2023-01-07 RX ADMIN — PIPERACILLIN AND TAZOBACTAM 3375 MG: 3; .375 INJECTION, POWDER, FOR SOLUTION INTRAVENOUS at 00:07

## 2023-01-07 RX ADMIN — SODIUM CHLORIDE 500 ML: 9 INJECTION, SOLUTION INTRAVENOUS at 01:52

## 2023-01-07 RX ADMIN — VANCOMYCIN HYDROCHLORIDE 1000 MG: 1 INJECTION, POWDER, LYOPHILIZED, FOR SOLUTION INTRAVENOUS at 01:18

## 2023-01-07 RX ADMIN — LORAZEPAM 1 MG: 1 TABLET ORAL at 19:29

## 2023-01-07 RX ADMIN — HYDROMORPHONE HYDROCHLORIDE 4 MG: 2 TABLET ORAL at 19:29

## 2023-01-07 RX ADMIN — HYDROMORPHONE HYDROCHLORIDE 0.5 MG: 1 INJECTION, SOLUTION INTRAMUSCULAR; INTRAVENOUS; SUBCUTANEOUS at 01:23

## 2023-01-07 ASSESSMENT — PAIN SCALES - GENERAL
PAINLEVEL_OUTOF10: 10
PAINLEVEL_OUTOF10: 10

## 2023-01-07 NOTE — ED PROVIDER NOTES
07:00 AM: I discussed the history, physical examination, laboratory and imaging studies, and treatment plan with Dr. Parul Goode. Maninder Conleyn was signed out to me in stable condition. Please see Dr. Oneida Ryder documentation for details of their history, physical, and laboratory studies. Upon re-examination, a summary of Panchito Waldrop's history, physical examination, and studies are as follows:  Patient was signed out to me pending transfer to [facility] for further evaluation due to concern for ischemic limb with infection    Received call from transfer center and accepting surgeon that patient is not a surgical candidate and that it would be \"impossible\" to perform amputation of any kind in treatment. Spoke with family, niece Josesito Anne and Sister Lissett Meek who is the POA, regarding plan after speaking with the surgeon. Patient is currently DNR has been in hospice care. I discussed possible options they would like to pursue inpatient hospice care at this point and comfort care measures. I will administer pain medication to the patient as needed. We did contact hospice and at this time the hospice nurse is currently onsite coordinating care and disposition planning. Plan to discharge either back to the nursing facility with hospice or to inpatient hospice. We will continue to update family as additional disposition planning occurs. I did update the patient regarding plan and that he would no longer be transferred to [facility] patient does indicate to me that he just wishes to be discharged from this emergency department and did not indicate any preference to where when asked.   Advised me to follow up with his family     Willie Lewis MD  01/07/23 9833

## 2023-01-07 NOTE — ED NOTES
Pt pulled out Peripheral IV access and his port access. Pt very agitated at this time. Pt had to be restrained due to no evidence of learning, and pulling at lines.       Sana Yoder RN  01/07/23 8883

## 2023-01-07 NOTE — ED NOTES
Pt is hospice, but a full code. Spoke with Kurtis Boyce at [facility], to verify home meds.       Edgardo Dudley RN  01/06/23 2117

## 2023-01-07 NOTE — PROGRESS NOTES
Hospice of [address] [place] [address] is for patient to transfer to [facility] at [address] with [address] [facility]. Phone call Naveen Honre and Kandace Momin, sister regarding and they are in agreement with the plan. Thank you. Any needs please call [facility] at [phone]. [address] [facility], [facility], [address] [place] [address]

## 2023-01-07 NOTE — ED NOTES
Priscilla RN offered patient something to eat/drink for dinner. Patient denied at this time. Placed a warm blanket on patient. Patient denies any other needs at this time.       Khushi Noyola RN  01/07/23 8289

## 2023-01-07 NOTE — ED NOTES
Checked on patient, assessed patients ostomy bag, was clean and empty without stool. Obtained warm blanket for patient, patient denies any other needs at this time.       Bekah Garza RN  01/07/23 9118

## 2023-01-07 NOTE — ED NOTES
Per Juan Gary RN with Hospice we are waiting to see if [facility] will accept for placement.       Daisha Campos RN  01/07/23 1640

## 2023-01-07 NOTE — ED NOTES
Rounded on patient, patient denies any needs. Asked if he wanted Dinner he stated \"no, not right now\". Per Brett Villalba RN with Hospice, he was accepted at [facility]. He will be leaving around Nathalie Rome RN asked that we medication patient prior to transport. Dr Rajat Celeste informed.       Geri Morrissey RN  01/07/23 9207

## 2023-01-07 NOTE — ED NOTES
Spoke with Blossom Christiansen at [facility], lenore told writer that pt is always confused. Pt has ripped down privacy curtains, and thrown stuff at staff and other residents. Pt baseline is A&O to self only, confirmed by lenore. Pt has had increased agitation over the past 2 hours, pt did receive one dose of ativan and dilaudid, which are his home doses, see MAR. Pt keeps yelling for Mateo Ambreen (sister). Pt had to be restrained due to pulling out [employer] and peripheral IV access.       Alonzo Ray, RN  01/07/23 0955

## 2023-01-07 NOTE — ED NOTES
Per Dr Rhoda Pretty, OK to discontinue restraints, de-access port, remove wires and take off monitor, and place bed monitor for fall risk.       Darlin Champion RN  01/07/23 4142

## 2023-01-07 NOTE — ED PROVIDER NOTES
[address] ED  EMERGENCY DEPARTMENT ENCOUNTER      I am the Primary Clinician of Record    Note started: 10:48 PM EST 1/6/23     I have seen and evaluated this patient with my supervising physician Mary Ann De La Rosa MD.        Pt Name: Luana Mendoza  MRN: [id number]  Douglasgfhomero 1960  Dateof evaluation: 1/6/2023  Provider: JESSENIA Pratt - CNP  PCP: No primary care provider on file. ED Attending: Mary Ann De La Rosa MD      CHIEF COMPLAINT       Chief Complaint   Patient presents with    Wound Check     Pt from [facility] arrives via EMS due to his left leg wound. Pt and facility states that the leg wound has progressed over the past couple days. Wound is black and has multiple open areas. No other complaints at this time       HISTORY OF PRESENTILLNESS   (Location/Symptom, Timing/Onset, Context/Setting, Quality, Duration, Modifying Factors, Severity)  Note limiting factors. Luana Mendoza is a 58 y.o. male for left leg wound check. Onset was over the last 5 weeks. Context includes patient reports over the last 5 weeks he has had increased wounds of his left leg. Patient states he is currently in a nursing home and was sent in today to have his left leg looked at. Patient is currently on hospice and has a history of anal cancer. Patient was seen at the [facility] in November 2022 and had vascular surgery at that time. Alleviating factors include nothing. Aggravating factors include nothing. Pain is 10/10. Nothing has been used for pain today. Nursing Notes were all reviewed and agreed with or any disagreements were addressed  in the HPI. REVIEW OF SYSTEMS       Review of Systems   Constitutional:  Negative for activity change, appetite change and fever. HENT:  Negative for congestion, facial swelling, rhinorrhea and sore throat. Eyes:  Negative for visual disturbance. Respiratory:  Negative for shortness of breath.     Cardiovascular:  Negative for chest pain. Gastrointestinal:  Negative for abdominal pain. Genitourinary:  Negative for difficulty urinating. Musculoskeletal:  Negative for arthralgias and myalgias. Left lower leg blue cold and pulseless   Skin:  Positive for wound. Negative for color change and rash. Neurological:  Negative for dizziness and light-headedness. Psychiatric/Behavioral:  Negative for agitation. All other systems reviewed and are negative. Positives and Pertinent negatives as per HPI. Except as noted above in the ROS, all other systems were reviewed and negative. PAST MEDICAL HISTORY     Past Medical History:   Diagnosis Date    Cancer [facility]) 2015    rectal cancer         SURGICAL HISTORY     History reviewed. No pertinent surgical history. CURRENT MEDICATIONS       Previous Medications    ACETAMINOPHEN (TYLENOL) 325 MG TABLET    Take 650 mg by mouth every 6 hours    CLOPIDOGREL (PLAVIX) 75 MG TABLET    Take 75 mg by mouth daily    HYDROMORPHONE (DILAUDID) 4 MG TABLET    Take 4 mg by mouth every 2 hours as needed for Pain. HYDROMORPHONE (DILAUDID) 8 MG TABLET    Take 8 mg by mouth every 2 hours as needed for Pain. LORAZEPAM (ATIVAN) 1 MG TABLET    Take 1 mg by mouth every 2 hours as needed for Anxiety. LORAZEPAM (ATIVAN) 2 MG TABLET    Take 2 mg by mouth every 2 hours as needed for Anxiety. ONDANSETRON (ZOFRAN ODT) 4 MG DISINTEGRATING TABLET    Take 1 tablet by mouth every 8 hours as needed for Nausea or Vomiting    OXYCODONE (ROXICODONE) 5 MG IMMEDIATE RELEASE TABLET        SENNA-DOCUSATE (PERICOLACE) 8.6-50 MG PER TABLET    Take 1 tablet by mouth daily as needed for Constipation         ALLERGIES     Vicodin [hydrocodone-acetaminophen]      FAMILY HISTORY     History reviewed. No pertinent family history.        SOCIAL HISTORY       Social History     Socioeconomic History    Marital status: Single     Spouse name: None    Number of children: None    Years of education: None    Highest education level: None   Tobacco Use    Smoking status: Every Day     Packs/day: 1.00     Types: Cigarettes    Smokeless tobacco: Never   Vaping Use    Vaping Use: Never used   Substance and Sexual Activity    Alcohol use: No    Drug use: No         SCREENINGS    Hershey Coma Scale  Eye Opening: Spontaneous  Best Verbal Response: Oriented  Best Motor Response: Obeys commands  Tarah Coma Scale Score: 15      CIWA Assessment  BP: 99/69  Heart Rate: (!) 105          PHYSICAL EXAM     ED Triage Vitals [01/06/23 2124]   BP Temp Temp Source Heart Rate Resp SpO2 Height Weight   107/65 98.8 °F (37.1 °C) Axillary 96 20 90 % -- --       Physical Exam  Constitutional:       Appearance: He is well-developed. He is ill-appearing. Comments: Chronically ill-appearing male   HENT:      Head: Normocephalic and atraumatic. Cardiovascular:      Rate and Rhythm: Normal rate. Pulmonary:      Effort: Pulmonary effort is normal. No respiratory distress. Abdominal:      General: There is no distension. Palpations: Abdomen is soft. Tenderness: There is no abdominal tenderness. Musculoskeletal:      Cervical back: Normal range of motion. Comments: Left lower leg with decreased range of motion due to pain elicited with movement. Left lower leg blue, pulseless, and cold. There are some black areas noted to his leg and foot. Skin:     General: Skin is warm and dry. Neurological:      Mental Status: He is alert and oriented to person, place, and time.        DIAGNOSTIC RESULTS   LABS:    Labs Reviewed   CBC WITH AUTO DIFFERENTIAL - Abnormal; Notable for the following components:       Result Value    WBC 21.4 (*)     Hemoglobin 11.8 (*)     Hematocrit 37.4 (*)     RDW 18.6 (*)     Neutrophils Absolute 19.6 (*)     All other components within normal limits   COMPREHENSIVE METABOLIC PANEL W/ REFLEX TO MG FOR LOW K - Abnormal; Notable for the following components:    Sodium 133 (*)     Chloride 92 (*)     BUN 47 (*)     Total Protein 8.3 (*)     Albumin/Globulin Ratio 0.7 (*)     ALT 48 (*)      (*)     All other components within normal limits   C-REACTIVE PROTEIN - Abnormal; Notable for the following components:    CRP 68.7 (*)     All other components within normal limits   CULTURE, BLOOD 2   CULTURE, BLOOD 1   LACTATE, SEPSIS   LACTATE, SEPSIS   PROCALCITONIN   SEDIMENTATION RATE   LACTATE, SEPSIS         All other labs were within normal range or not returned as of this dictation. EKG: All EKG's are interpreted by the Emergency Department Physician who either signs or Co-signs this chart in the absence of a cardiologist.  Please see their note for interpretation of EKG. RADIOLOGY:   Non plain film images ans such as CT, ultrasound, and MRI are read by the radiologist. Plain radiographic images are visualized and preliminarily interpreted by the ED Provider with the below findings:            Interpretation per the Radiologist below, if available at the time of this note:    XR TIBIA FIBULA LEFT (2 VIEWS)    (Results Pending)   XR FOOT LEFT (MIN 3 VIEWS)    (Results Pending)     No results found. No results found. No results found. PROCEDURES   Unless otherwise noted below, none     Procedures     CRITICAL CARE TIME   Unless otherwise noted below, none    ***      EMERGENCYDEPARTMENT COURSE/MDM:     Vitals:    Vitals:    01/06/23 2124 01/06/23 2343   BP: 107/65 99/69   Pulse: 96 (!) 105   Resp: 20 16   Temp: 98.8 °F (37.1 °C)    TempSrc: Axillary    SpO2: 90% 97%                   Patient was seen and evaluated by myself and Dr. Rosas Hi. Patient here for concerns for worsening wound to his left leg. Patient currently resides in a nursing home and is a full code however is on hospice. Patient reports that his leg has been progressively getting worse over the last 5 to 6 weeks. On exam he is awake and alert was found to be slightly tachycardic.   He is intermittently confused but does seem to answer questions appropriately. He is able to follow commands. Patient does have a cold pulseless left lower extremity. I was able to review the chart and found that he did have vascular surgery done at [facility] in November 2022. Per documentation shows that there would be no additional surgery as far as the vascular goes to repair his obstruction if this would occur again and patient was ultimately discharged to the long-term care facility. Multiple calls have been made to the patient's sister and niece to clarify the patient's wishes and family is in agreement with having the patient transferred back to [facility] to have vascular evaluate whether an amputation is needed or not. Lab values have been reviewed. Patient did have sepsis protocol started and received IV fluids however there was no weight charted so he was given a liter. Antibiotics were also started. Additional labs have been ordered. Patient is currently on a wait list for [facility] to be transferred. I did inform the family that the patient would be in the ED overnight and that [facility] was discharged dependent. They verbalized understanding and requested a call when he is transferred to [facility].  They state that they will be in to see him at [facility] in the morning. Patient was provided with this information as well. Patient was given the following medications:  Medications   vancomycin 1000 mg IVPB in 250 mL D5W addavial (has no administration in time range)   piperacillin-tazobactam (ZOSYN) 3,375 mg in sodium chloride 0.9 % 100 mL IVPB (mini-bag) (3,375 mg IntraVENous New Bag 1/7/23 0007)   0.9 % sodium chloride bolus (has no administration in time range)            CONSULTS:  None      Discussion with other professionals -vascular x2 at the [facility].    Social determinants -patient has cancer however is on hospice and is still full code.     Records Reviewed -chart from the [facility] vascular department was reviewed and interpreted.    History from - patient, family, ED nurse, and nursing home staff    Limitations to history- none    Chronic conditions: has a past medical history of Cancer ([facility]) (2015).        Is this patient to be included in the SEP-1 Core Measure due to severe sepsis or septic shock?   No   Exclusion criteria - the patient is NOT to be included for SEP-1 Core Measure due to:  2+ SIRS criteria are not met         The patient tolerated their visit well. I have evaluated this patient. My supervising physician was available for consultation. The patient and / or the family were informed of the results of any tests, a time was given to answer questions, a plan was proposed and they agreed with plan.        FINAL IMPRESSION      1. Ischemic leg    2. History of cancer    3. Leukocytosis, unspecified type          DISPOSITION/PLAN   DISPOSITION        PATIENT REFERRED TO:  No follow-up provider specified.    DISCHARGE MEDICATIONS:  New Prescriptions    No medications on file       DISCONTINUED MEDICATIONS:  Discontinued Medications    No medications on file              (Please note that portions of this note were completed with a voice recognition program.  Efforts were made to edit the dictations but occasionally words are mis-transcribed.)    Patient was seen during the time of the COVID pandemic.  N95 and appropriate PPE was worn during the visit.      JESSENIA Betancourt - CNP (electronically signed)      addavial (0 mg IntraVENous Stopped 1/7/23 0238)   piperacillin-tazobactam (ZOSYN) 3,375 mg in sodium chloride 0.9 % 100 mL IVPB (mini-bag) (0 mg IntraVENous Stopped 1/7/23 0117)   0.9 % sodium chloride bolus (0 mLs IntraVENous Stopped 1/7/23 0149)   0.9 % sodium chloride bolus (0 mLs IntraVENous Stopped 1/7/23 0240)   LORazepam (ATIVAN) tablet 1 mg (1 mg Oral Given 1/7/23 0123)   HYDROmorphone (DILAUDID) injection 0.5 mg (0.5 mg IntraVENous Given 1/7/23 0123)   HYDROmorphone (DILAUDID) tablet 4 mg (4 mg Oral Given 1/7/23 0939)   HYDROmorphone (DILAUDID) tablet 4 mg (4 mg Oral Given 1/7/23 1929)   LORazepam (ATIVAN) tablet 1 mg (1 mg Oral Given 1/7/23 1929)       ED Course as of 01/21/23 1523   Sat Jan 07, 2023   1523 DNR CC  Determining inpatient vs outpatient hospice [ER]      ED Course User Index  [ER] Robby Galvez MD       CONSULTS:  None      Discussion with other professionals -vascular x2 at the [facility].    Social determinants -patient has cancer however is on hospice and is still full code. Records Reviewed -chart from the [facility] vascular department was reviewed and interpreted. History from - patient, family, ED nurse, and nursing home staff    Limitations to history- none    Chronic conditions: has a past medical history of Cancer ([facility] [address] (2015). Is this patient to be included in the SEP-1 Core Measure due to severe sepsis or septic shock? No   Exclusion criteria - the patient is NOT to be included for SEP-1 Core Measure due to:  2+ SIRS criteria are not met         The patient tolerated their visit well. I have evaluated this patient. My supervising physician was available for consultation. The patient and / or the family were informed of the results of any tests, a time was given to answer questions, a plan was proposed and they agreed with plan. FINAL IMPRESSION      1. Ischemic leg    2. History of cancer    3.  Leukocytosis, unspecified type DISPOSITION/PLAN   DISPOSITION Decision To Discharge 01/07/2023 07:40:03 PM      PATIENT REFERRED TO:  No follow-up provider specified. DISCHARGE MEDICATIONS:  Discharge Medication List as of 1/7/2023  5:25 PM          DISCONTINUED MEDICATIONS:  Discharge Medication List as of 1/7/2023  5:25 PM                 (Please note that portions of this note were completed with a voice recognition program.  Efforts were made to edit the dictations but occasionally words are mis-transcribed.)    Patient was seen during the time of the [place] pandemic. N95 and appropriate PPE was worn during the visit.       JESSENIA Bee CNP (electronically signed)        JESSENIA Bee CNP  01/21/23 1529

## 2023-01-07 NOTE — ED NOTES
Spoke with Jimbo Adames at nursing home. States they do not have DNR paperwork at nursing home but that the patients sister agreed to make him a DNR yesterday. Spoke with Mayefrain Lozano, patients niece, states she will have her mother Clive Hernandez, patient's POA come to ER to sign paperwork. Dr. Bakari Bentley aware.       Sandy Jacobs, RN  01/07/23 4353

## 2023-01-07 NOTE — ED NOTES
Writer was attempting to get blood, pt started masturbating and pulling blanket down to show writer his penis.  Pt was told to please stop, pt ignored writers request       July Azevedo RN  01/06/23 6677

## 2023-01-07 NOTE — ED NOTES
Writer confirmed pt has power port with pt and with pts sister. Writer accessed pts port using aseptic technique, blood return noted.       Snow Pires RN  01/07/23 6418

## 2023-01-07 NOTE — ED NOTES
Helped patient eat. Gave pain medication. Denies any other needs at this time.       Vasile Gorman RN  01/07/23 6723

## 2023-01-07 NOTE — ED NOTES
[address] called with transport, [address], ETA: 1:15-1:30  1302- Called transport and canceled     [address]  01/07/23 [facility] [facility]  01/07/23 1302

## 2023-01-07 NOTE — ED NOTES
Awaiting patients sister to come here to sign patients DNR. Patient can not be transferred to [facility] at 8pm without this signed. Sister Kathleen Melendez is aware and said will be here within the hour.       Rehana Jacobs RN  01/07/23 1559

## 2023-01-07 NOTE — ED NOTES
[address] [facility] for update, they talked with [facility], should be getting a bed today     Ray Terrell  01/07/23 3745

## 2023-01-07 NOTE — ED NOTES
Elly Rubio RN to call report to [facility]. Elly Rubio RN also set  Up transport for patient.       Sabrina Perkins RN  01/07/23 8603

## 2023-01-07 NOTE — ED NOTES
This RN asked patient if he wanted something to eat, drink, or if he needed to use the bathroom. Patient denies any needs at this time. Checked restraints, still intact with no injuries.       Fidel Oneil RN  01/07/23 5175

## 2023-01-07 NOTE — ED NOTES
Per Dr Jeanette Robertson to call [address] per family request to do inpatient hospice care at the [facility] location. Per Dr Jeanette Robertson the family signed DNR last night at the nursing home. Patient is not candidate for surgery. Called [phone], spoke with Precilla Blower who will get the process started. Will call [facility] to verify information. Will return call.       Darylene Broody, RN  01/07/23 5761

## 2023-01-07 NOTE — ED NOTES
Patient is asleep in bed at this time. Not in any distress. Denies any needs at this time.       Kanchan Horan RN  01/07/23 6545

## 2023-01-07 NOTE — ED NOTES
Per Dr Melonie Landers, patient is no longer a candidate for surgery, discontinuing the transfer to [facility]. Dr Melonie Landers requesting to speak with patients sister Jak Blankenship. This RN tried calling Jak Blankenship, phone number went to voicemail. Will try again at a later time.       Kathi Caceres RN  01/07/23 4264

## 2023-01-08 LAB
BLOOD CULTURE, ROUTINE: NORMAL
CULTURE, BLOOD 2: NORMAL

## 2023-01-08 NOTE — ED NOTES
DNR signed with Dr Marcelo Morrison.  Copies made and given to family Enrique Montaño RN  01/07/23 2039

## 2023-01-08 NOTE — PROGRESS NOTES
I have been told that patient was DNR CC, however with further discussion patient had been verbally made DNR CC by family yesterday but no paperwork had been signed. Resuscitation/Code Status Note on Fiona Basilio (YOB: 1960)    At 1906 on January 7, 2023, resuscitation/code status decision was based on a thorough discussion with the patient's healthcare power of  - Precious Sicard . The code status was made DNR CC.   Paperwork cosigned by family, copy made to provide nursing facility    Electronically signed by Marianne Kaur MD on 1/7/23 at 7:06 PM EST

## 2023-01-11 LAB
BLOOD CULTURE, ROUTINE: NORMAL
CULTURE, BLOOD 2: NORMAL